# Patient Record
Sex: FEMALE | Race: WHITE
[De-identification: names, ages, dates, MRNs, and addresses within clinical notes are randomized per-mention and may not be internally consistent; named-entity substitution may affect disease eponyms.]

---

## 2019-02-13 ENCOUNTER — HOSPITAL ENCOUNTER (INPATIENT)
Dept: HOSPITAL 25 - ED | Age: 49
LOS: 6 days | Discharge: HOME | DRG: 317 | End: 2019-02-19
Attending: HOSPITALIST | Admitting: INTERNAL MEDICINE
Payer: COMMERCIAL

## 2019-02-13 DIAGNOSIS — L02.415: ICD-10-CM

## 2019-02-13 DIAGNOSIS — Z79.899: ICD-10-CM

## 2019-02-13 DIAGNOSIS — M86.171: ICD-10-CM

## 2019-02-13 DIAGNOSIS — I10: ICD-10-CM

## 2019-02-13 DIAGNOSIS — Z80.0: ICD-10-CM

## 2019-02-13 DIAGNOSIS — Z91.010: ICD-10-CM

## 2019-02-13 DIAGNOSIS — F17.210: ICD-10-CM

## 2019-02-13 DIAGNOSIS — R11.2: ICD-10-CM

## 2019-02-13 DIAGNOSIS — E11.621: ICD-10-CM

## 2019-02-13 DIAGNOSIS — Z91.041: ICD-10-CM

## 2019-02-13 DIAGNOSIS — E11.65: ICD-10-CM

## 2019-02-13 DIAGNOSIS — Z80.1: ICD-10-CM

## 2019-02-13 DIAGNOSIS — L97.818: ICD-10-CM

## 2019-02-13 DIAGNOSIS — L97.519: ICD-10-CM

## 2019-02-13 DIAGNOSIS — B95.1: ICD-10-CM

## 2019-02-13 DIAGNOSIS — I73.9: ICD-10-CM

## 2019-02-13 DIAGNOSIS — Z88.2: ICD-10-CM

## 2019-02-13 DIAGNOSIS — E11.69: Primary | ICD-10-CM

## 2019-02-13 DIAGNOSIS — Z88.0: ICD-10-CM

## 2019-02-13 DIAGNOSIS — E11.622: ICD-10-CM

## 2019-02-13 DIAGNOSIS — Z82.3: ICD-10-CM

## 2019-02-13 LAB
ANION GAP SERPL CALC-SCNC: 8 MMOL/L (ref 2–11)
BASOPHILS # BLD AUTO: 0.1 10^3/UL (ref 0–0.2)
BUN SERPL-MCNC: 12 MG/DL (ref 6–24)
BUN/CREAT SERPL: 19 (ref 8–20)
CALCIUM SERPL-MCNC: 9.4 MG/DL (ref 8.6–10.3)
CHLORIDE SERPL-SCNC: 103 MMOL/L (ref 101–111)
EOSINOPHIL # BLD AUTO: 0.2 10^3/UL (ref 0–0.6)
GLUCOSE SERPL-MCNC: 291 MG/DL (ref 70–100)
HCO3 SERPL-SCNC: 22 MMOL/L (ref 22–32)
HCT VFR BLD AUTO: 46 % (ref 35–47)
HGB BLD-MCNC: 15.8 G/DL (ref 12–16)
LYMPHOCYTES # BLD AUTO: 2.3 10^3/UL (ref 1–4.8)
MCH RBC QN AUTO: 31 PG (ref 27–31)
MCHC RBC AUTO-ENTMCNC: 35 G/DL (ref 31–36)
MCV RBC AUTO: 90 FL (ref 80–97)
MONOCYTES # BLD AUTO: 0.6 10^3/UL (ref 0–0.8)
NEUTROPHILS # BLD AUTO: 8.8 10^3/UL (ref 1.5–7.7)
NRBC # BLD AUTO: 0 10^3/UL
NRBC BLD QL AUTO: 0
PLATELET # BLD AUTO: 301 10^3/UL (ref 150–450)
POTASSIUM SERPL-SCNC: 4.1 MMOL/L (ref 3.5–5)
RBC # BLD AUTO: 5.08 10^6/UL (ref 4–5.4)
SODIUM SERPL-SCNC: 133 MMOL/L (ref 135–145)
WBC # BLD AUTO: 12 10^3/UL (ref 3.5–10.8)

## 2019-02-13 PROCEDURE — 87640 STAPH A DNA AMP PROBE: CPT

## 2019-02-13 PROCEDURE — 93005 ELECTROCARDIOGRAM TRACING: CPT

## 2019-02-13 PROCEDURE — C1894 INTRO/SHEATH, NON-LASER: HCPCS

## 2019-02-13 PROCEDURE — 87077 CULTURE AEROBIC IDENTIFY: CPT

## 2019-02-13 PROCEDURE — 87641 MR-STAPH DNA AMP PROBE: CPT

## 2019-02-13 PROCEDURE — 36415 COLL VENOUS BLD VENIPUNCTURE: CPT

## 2019-02-13 PROCEDURE — 87205 SMEAR GRAM STAIN: CPT

## 2019-02-13 PROCEDURE — G0378 HOSPITAL OBSERVATION PER HR: HCPCS

## 2019-02-13 PROCEDURE — 80202 ASSAY OF VANCOMYCIN: CPT

## 2019-02-13 PROCEDURE — 90686 IIV4 VACC NO PRSV 0.5 ML IM: CPT

## 2019-02-13 PROCEDURE — 87076 CULTURE ANAEROBE IDENT EACH: CPT

## 2019-02-13 PROCEDURE — 87184 SC STD DISK METHOD PER PLATE: CPT

## 2019-02-13 PROCEDURE — 93922 UPR/L XTREMITY ART 2 LEVELS: CPT

## 2019-02-13 PROCEDURE — C1887 CATHETER, GUIDING: HCPCS

## 2019-02-13 PROCEDURE — 87073 CULTURE BACTERIA ANAEROBIC: CPT

## 2019-02-13 PROCEDURE — 86140 C-REACTIVE PROTEIN: CPT

## 2019-02-13 PROCEDURE — 85025 COMPLETE CBC W/AUTO DIFF WBC: CPT

## 2019-02-13 PROCEDURE — 84520 ASSAY OF UREA NITROGEN: CPT

## 2019-02-13 PROCEDURE — 87070 CULTURE OTHR SPECIMN AEROBIC: CPT

## 2019-02-13 PROCEDURE — 99156 MOD SED OTH PHYS/QHP 5/>YRS: CPT

## 2019-02-13 PROCEDURE — 76937 US GUIDE VASCULAR ACCESS: CPT

## 2019-02-13 PROCEDURE — 93925 LOWER EXTREMITY STUDY: CPT

## 2019-02-13 PROCEDURE — 83036 HEMOGLOBIN GLYCOSYLATED A1C: CPT

## 2019-02-13 PROCEDURE — C1769 GUIDE WIRE: HCPCS

## 2019-02-13 PROCEDURE — 80053 COMPREHEN METABOLIC PANEL: CPT

## 2019-02-13 PROCEDURE — 80048 BASIC METABOLIC PNL TOTAL CA: CPT

## 2019-02-13 PROCEDURE — 99406 BEHAV CHNG SMOKING 3-10 MIN: CPT

## 2019-02-13 PROCEDURE — 87040 BLOOD CULTURE FOR BACTERIA: CPT

## 2019-02-13 PROCEDURE — 87186 SC STD MICRODIL/AGAR DIL: CPT

## 2019-02-13 PROCEDURE — 82565 ASSAY OF CREATININE: CPT

## 2019-02-13 PROCEDURE — 93978 VASCULAR STUDY: CPT

## 2019-02-13 PROCEDURE — 99157 MOD SED OTHER PHYS/QHP EA: CPT

## 2019-02-13 PROCEDURE — 85652 RBC SED RATE AUTOMATED: CPT

## 2019-02-13 PROCEDURE — 99283 EMERGENCY DEPT VISIT LOW MDM: CPT

## 2019-02-13 RX ADMIN — CEFTRIAXONE SODIUM SCH MLS/HR: 1 INJECTION, POWDER, FOR SOLUTION INTRAVENOUS at 18:09

## 2019-02-13 RX ADMIN — ACETAMINOPHEN PRN MG: 325 TABLET ORAL at 17:39

## 2019-02-13 RX ADMIN — INSULIN LISPRO SCH UNITS: 100 INJECTION, SOLUTION INTRAVENOUS; SUBCUTANEOUS at 20:53

## 2019-02-13 RX ADMIN — PSEUDOEPHEDRINE HCL SCH MG: 120 TABLET, FILM COATED, EXTENDED RELEASE ORAL at 17:39

## 2019-02-13 RX ADMIN — SODIUM CHLORIDE, SODIUM LACTATE, POTASSIUM CHLORIDE, AND CALCIUM CHLORIDE SCH MLS/HR: 600; 310; 30; 20 INJECTION, SOLUTION INTRAVENOUS at 20:16

## 2019-02-13 RX ADMIN — IBUPROFEN PRN MG: 800 TABLET ORAL at 20:23

## 2019-02-13 RX ADMIN — VANCOMYCIN HYDROCHLORIDE SCH MLS/HR: 1 INJECTION, POWDER, LYOPHILIZED, FOR SOLUTION INTRAVENOUS at 23:43

## 2019-02-13 RX ADMIN — IBUPROFEN PRN MG: 800 TABLET ORAL at 14:36

## 2019-02-13 RX ADMIN — LORATADINE SCH MG: 10 TABLET ORAL at 17:39

## 2019-02-13 RX ADMIN — ACETAMINOPHEN PRN MG: 325 TABLET ORAL at 23:46

## 2019-02-13 NOTE — PN
Hospitalist Progress Note


Date of Service: 02/13/19





Ms Marsh is a 48 year old female with no known/reported medical history that 

was [place] [facility] [place] non-healing right shin ulcer and wound to 3rd toe. 

Patient has been seen by Dr. Doyle from surgery who sent the patient for 

MRI to r/o osteo. Imaging reveals an abscess that will require surgical 

debridement. Patient is also found to have an A1C of >9, and TEAGAN's obtained are 

in the range of 0.3-0.6, indicating poor vascular flow. Plan is to take the 

patient to the OR tomorrow for surgical debridement of the shin, follow up MRI 

of the toe and revascularization in IR with Dr. Esquivel on Friday.





In terms of medical optimization, we will obtain an EKG and start patient on 

insulin SS and diabetic diet. Patient denies chest pain, no SOB, no chest pain 

at rest. She sleeps on a single pillow at night and endorses no exertional 

dyspnea. she is able to walk up several flights of stairs without requiring 

rest. She has no family history of CAD/no sudden cardiac death.





Patient is medically optimized for surgery, she has an RCRI Risk Score of 1 

Point, Class II Risk with a 6.0% chance of major cardiac event. I would 

recommend consultation with endocrine before discharge to help manage newly 

diagnosed diabetes to aid in wound healing. Patient is cleared to proceed with 

surgery in the morning. Keep NPO after midnight. Continue atbx as ordered, 

follow cultures.

## 2019-02-13 NOTE — CONS
CONSULTATION REPORT:

 

DATE OF CONSULT:  02/13/19

 

REFERRING PROVIDER:  Dr. Ora Deng.

 

REASON FOR CONSULTATION:  Right third toe ulcer and right distal anterior shin 
ulcer with cellulitis.

 

HISTORY OF PRESENT ILLNESS:  Ms. Brandy Marsh is a 48-year-old woman, who does 
not undergo routine medical care, who lives in [place], [place], who was 
seen by me in the [facility] this morning after she was referred from the 
[facility] where she was seen last week.

 

She presented to the emergency room there last week.  According to the records 
and to her, she had complaints of a new wound at the distal right anterior shin
, which had worsened.  In addition, she states that last fall when she was 
trimming her nails on the right third toe, sustained an injury with a 
persistent ulceration that had not healed since that time.  She had not sought 
care, but when the ulceration developed in the right lower shin area and became 
chronic and subsequently enlarged in size over the past several weeks, she 
presented to the emergency room.

 

She denied recent fevers.  She was noted to be afebrile on a visit there last 
week. At that time, she was noted to have a white blood cell count of 15,000.  
Her blood sugar was noted to be 257.  Electrolytes and renal function were all 
unremarkable. She had an albumin of 3.5.

 

I do not see where plain x-rays were obtained.  She thought that cultures had 
been obtained, but I do not see them either in the [facility] records or the [facility] records.

 

She was started on clindamycin and referred to the wound center here today.

 

She states that the right foot and distal calf have been red and swollen for 
several weeks.  She has not had fever, shakes, or chills.  She has been 
ambulating wearing shoes.  She does not work; however, does spend the day 
taking care of her grandchildren.

 

She smokes about a quarter pack of cigarettes per day.  She denies other 
medical issues such as diabetes, coronary artery disease, peripheral vascular 
disease.  She has no neuropathic changes in either lower extremity.

 

PAST MEDICAL HISTORY:  Unremarkable.

 

PAST SURGICAL HISTORY:  None.

 

MEDICATIONS:  Clindamycin, started last week.

 

ALLERGIES:  GADOLINIUM, IODINE, peanuts, PENICILLIN, and SULFA.

 

SOCIAL HISTORY:  She does not drink alcohol.  She lives with her .  She 
smokes about a quarter pack of cigarettes per day.

 

REVIEW OF SYSTEMS:  Otherwise, unremarkable as per above.

 

PHYSICAL EXAM:  She is afebrile.  Heart rate is regular in the 80s.  In general
, she is a well-developed, well-nourished female, appears to be in no apparent 
distress.  The left lower extremity shows no evidence of swelling, hair loss.  
She has weakly palpable dorsalis pedis and posterior tibial pulses in the left 
foot. There is no ulceration in the left foot.  She seems to be intact to light 
touch and pinprick.  In the right lower extremity, there is redness of the foot 
in the distal half of the shin with edema.  This is mildly tender.

 

Overlying the nail bed at the right third toe is an opening without evidence of 
an abscess or purulent drainage.  However, this probes several millimeters, but 
it is too painful to advance and I am not certain if bone is exposed or this 
tracts to bone.

 

Also noted on the right distal shin is a chronic-appearing ulcer of 
approximately 3 x 3 cm with a fibrin base.  More proximal and in confluence 
with this lower ulceration is some discolored skin and more superficial 
ulceration with some turbid fluid that drains from around the edges.  There is 
no dry eschar.  I appreciate no fluctuance.  There is no crepitans noted.

 

IMPRESSION:  Right third toe ulcer, right lower shin ulceration.  The patient 
is quite vague as to the etiology of the shin ulcer and basically states that 
"these have just appeared."  She denies trauma, insect bites, or repetitive 
injury.  I am not certain of the etiology, although this may be venous. The 
history is not typical for this. I think she may have a component of peripheral 
vascular disease and is a smoker as well.

 

Also, the concern is an osteomyelitis in the right third toe or distal foot, 
which also needs to be evaluated.

 

After seeing the patient in the wound center and evaluating her leg and the 
fact that it does not appear to be improving with local wound care as well as 
oral clindamycin, I feel strongly that she should be admitted to the hospital 
for further care including IV antibiotics and an expedited workup.

 

I discussed the care with Dr. Deng, the hospitalist who will admit, and we will 
see her in surgical consultation for wound care and further workup.

 

PLAN:

1.  I recommend IV antibiotics.

2.  Wound care will be initiated.

3.  MRI of the foot should be obtained to rule out osteomyelitis.

4.  Consider a CAT scan of the lower extremity to rule out deeper soft tissue 
infection mainly in the distal shin area.

5.  Noninvasive arterial study should be obtained.

6.  Consideration of ID consultation for management of antibiotics in light of 
her allergies and possible osteomyelitis.

 

Thank you for the consultation.  We will follow her closely with you.

 

 [id number]/[facility] #: [id number]

YOSEF

## 2019-02-13 NOTE — HP
CC:  Dr. Kendra Renner; Dr. Keshav Doyle *

 

HISTORY AND PHYSICAL:

 

DATE OF ADMISSION:  19

 

PRIMARY CARE PROVIDER:  Dr. Kendra Renner.

 

OTHER PROVIDER:  Dr. Keshav Doyle.

 

ATTENDING PHYSICIAN:  Dr. Ora Deng * (dictated by OZZY Gale).

 

CHIEF COMPLAINT:  Nonhealing wounds on the right lower extremity.

 

HISTORY OF PRESENT ILLNESS:  Mrs. Marsh is a 48-year-old female with no 
reported past medical history, who complains of right lower extremity third 
digit wound that has been present for approximately 8 months.  She states she 
was filing her toenail with a Dremel like tool.  The machine was meka and she 
injured her third toe.  She also states that at that time a month ago, she had 
a lateral shin wound that was also present.  She is unsure how she got that 
wound.  She managed the wounds on her own for approximately 6-1/2 months and 
they shrank in size and then would reappear, but never fully healed.  
Approximately 1-1/2 months ago, the patient reports that she had swelling and 
redness that were intermittent and located only in her foot. The wound on her 
third digit was still present at that time.  She states that the swelling and 
redness would occur 1 to 2 times per week.  She believes that this began when 
the wound was almost healed, but was re-injured by a dog in her household 
stepping on her toe.  This persisted for approximately a month and a half.

 

Approximately 1 week ago, the patient states that she woke up and her right 
pant leg was soaked through from weeping of the shine wound on the right lower 
extremity that was located on her shin.  She sought medical attention at that 
time and was given a prescription for clindamycin 300 mg q.i.d., which she has 
been taking as directed.  She was also instructed in managing the wound with wet
-to-dry dressings at night and dry dressings during the day.  She did this for 
one week.



Today, she presented to the [facility] to see Dr. Doyle regarding her 
shin wound, which had increased in size over the last 1 week. The right toe 
wound remains the same size, but is also not healing and has an opening at the 
base of the toe.  The foot and shin had turned red and had started to swell.  
The patient states that she has tried ibuprofen, which has helped a bit with 
the pain. Elevation does not appear to change the pain or decrease the 
swelling.  Today, she describes the pain as an ache that is rated at a 2/10.  
The pain does not radiate.  It is constant with varying degrees of intensity 
that ebbs and flows.  The shin redness appeared Wednesday with the onset of 
weeping.  The patient denies chest pain, shortness of breath, headache, vision 
changes, abdominal pain, nausea, vomiting, diarrhea, constipation, or pain in 
her upper extremities or left lower extremity.  She denies calf tenderness.  
She denies fever, chills, warmth of the right lower extremity, color changes 
other than erythema to the right lower extremity.  She saw Dr. Doyle today 
who sent her to be directly admitted.  She came to the ER and was evaluated.  
While in the emergency room, the patient received a workup.  The hospitalists 
were then asked to evaluate for admission.

 

PAST MEDICAL HISTORY:  None.

 

PAST SURGICAL HISTORY:   x1; lumbar spine L5-S1 surgery; cervical 
spine surgery, plates and screws; hysterectomy.

 

HOME MEDICATIONS:  Loratadine/pseudoephedrine 1 tab p.o. q.p.m.

 

ALLERGIES:  IODINATED CONTRAST, numbness; PENICILLIN, swelling of face, lips 
and throat; SULFA, swelling of face, lips and throat; PEANUTS, hives and throat 
swelling.

 

FAMILY HISTORY:  Father passed away of lung cancer.  Mother is healthy.  The 
patient states that all of her grandparents are .  She notes a history 
of esophageal cancer as well as CVA, although she is unsure how her 
grandparents passed away.

 

SOCIAL HISTORY:  The patient smokes approximately a quarter of a pack per day 
for approximately 30 years.  She rarely drinks, stating that she drinks less 
than 1 drink per week.  She is  and lives with her .  She has 2 
kids who live out of the house.  She takes care of her grandchildren.  In the 
event that she is unable to make her own medical decisions, she appoints her 
, Chuck Marsh, phone number [phone], as her decision maker.

 

REVIEW OF SYSTEMS:  A 10-point review of systems was performed and all the 
pertinent positives and negatives are in the HPI.  All other systems are 
negative.

 

                               PHYSICAL EXAMINATION

 

GENERAL:  Ms. Marsh is a well-developed, well-nourished white woman, who is 
sitting in a chair.  She is in no acute distress.  She appears her stated age.

 

VITAL SIGNS:  Temperature 99.6, heart rate is 66, respiratory rate is 16, 
oxygen saturation is 98% on room air, blood pressure is 134/82.

 

HEENT:  Visual fields are grossly intact.  Pupils are equally round and 
reactive to light.  Extraocular movements are intact.  Sclerae are without 
icterus.  Hearing is grossly intact.  External auditory canals are patent.  
They are free of cerumen. The tympanic membranes are intact with visible 
landmarks.  Nares are patent with moist mucous membranes and nonerythematous.  
Oral mucous membranes are moist and without lesions.  Pharynx is clear.

 

NECK:  Full range of motion.  Thyroid is not palpable.  Trachea is at midline.  
No lymphadenopathy.  Nontender to palpation.

 

RESPIRATORY:  Symmetrical chest expansion with no use of accessory muscles.  
Lungs are clear to auscultation.  No rhonchi, wheezes, or rales.

 

CARDIOVASCULAR:  Regular rate and rhythm.  S1, S2 present.  No murmurs, rubs, 
or gallops.  No JVD.

 

ABDOMEN:  Bowel sounds in all quadrants.  Abdomen is soft and nontender to 
palpation.  There is no hepatosplenomegaly.

 

MUSCULOSKELETAL:  Full range of motion.  No pain or deformities.

 

EXTREMITIES:  Skin is warm and smooth bilaterally.  There is no clubbing or 
cyanosis.  Radial and pedal pulses are 2+ bilaterally.  No edema in the upper 
extremities or the left lower extremity.  Right lower extremity with 1+ pitting 
edema.  The right lower extremity is erythematous, but not warm to the touch.  
There is a toenail deformity as well as a small wound at the base of the right 
third toenail which appears deep.  The wound is weeping and has purulent 
discharge when gently expressed.   The right shin has a large wound with 
regular edges that is deep.  The wound is approximately 2 mm deep.  The open 
wound measures approximately 1.5 x 1.5 inches. Just above that wound, there is 
an unopened wound that is flat against the skin and measures approximately 2 x 
1.5 inches.  The patient's right lower extremity is erythematous from the knee 
distal.

 

NEURO:  The patient is awake, alert, and oriented x3.  Cranial nerves are 
grossly intact.  She is able to move all of her extremities.  Her motor 
strength is 5/5 in both upper and lower extremities.  She has a steady gait 
with no impairments.

 

 DIAGNOSTIC STUDIES/LAB DATA:  WBC 12.0, RBC 5.08, HGB 15.8, HCT 46, ESR 44.  
Sodium 133, potassium 4.1, chloride 103, carbon dioxide 22, BUN 12, creatinine 
0.63, glucose 291.  CRP 15.78.

 

ASSESSMENT AND PLAN:  Ms. Marsh is a 48-year-old female with no past medical 
history, who presents to the ER today with complaints of a nonhealing wound.  
The patient will be admitted to observation for:

 

1.  Cellulitis versus osteomyelitis.  The patient has 2 lower extremity wounds 
that have not healed or improved with the use of clindamycin over the last 1 
week. She has had these wounds for approximately 8 months; they have gone 
through various stages of healing, but have never fully healed.  For pain 
control, acetaminophen 650 or oxycodone/acetaminophen have been ordered.  An 
MRI of the right lower extremity has been ordered to determine if the patient 
has osteomyelitis.  Ankle-brachial indexes have been ordered to determine if 
the patient has sufficient blood flow to the area.  Infectious Disease has been 
consulted regarding the patient's multiple allergies to antibiotics such as 
PENICILLIN and SULFA..  Dr. Doyle and the surgical team will continue to 
follow along as he was her wound care doctor.  Vancomycin has been ordered.  
Ceftriaxone has been ordered.

2.  Hyperglycemia.  The patient's blood glucose random was 291.  Hemoglobin A1c 
ordered.  Fingerstick glucose a.c. ordered.  Once results of hemoglobin A1c 
obtained, consider diabetic diet education if necessary.

3.  FEN:  IV normal saline at 100 cc per hour x1 bag.  Heart-healthy, 
consistent carbohydrate diet.

4.  DVT prophylaxis:  Based on the DVT Risk Assessment, the patient is moderate 
risk.  I will order Lovenox 40 daily.

5.  Code status:  The patient is full code.

 

TIME SPENT:  Approximately 60 minutes was spent on this admission, greater than 
half that time was spent with the patient obtaining history, performing physical
, and reviewing the plan of care.

 

The case has been reviewed with my attending, Dr. Deng, who is in agreement 
with the plan of care.

 

 ____________________________________ 

OZZY TYSON

 

[id number]/[facility] #: [id number]

YOSEF

## 2019-02-13 NOTE — ED
Skin Complaint





- HPI Summary


HPI Summary: 


Patient is a 48-year-old female who was sent in by the wound care.  Clinic for 

concern over osteomyelitis.  Dr. Doyle called to Dr. Deng for a direct 

admit.  However, patient came to ED.  Basic labs obtained.  Patient states the 

course of less than 2 weeks, a small area to the anterior right lower extremity 

has now become a large ulcer.  Also she states she has been dealing with the 

middle toe with erythema x 8 mos which has recently worsened over the past 1.5 

mos after her dogs nail punctured the area.  This continues to drain 

intermittently, yellow purulent discharge.  None currently.  She denies any 

fevers, sweats, chills.  She denies any pain to the lower extremity otherwise.








- History of Current Complaint


Chief Complaint: EDSoftTissueLowExtr


Time Seen by Provider: 02/13/19 11:57


Stated Complaint: WOUND ON RIGHT LEG


Hx Obtained From: Patient


Onset/Duration: Started Hours Ago, Started Weeks Ago


Skin Exposure Onset/Duration: Hours Ago, Weeks Ago


Timing: Constant


Onset Severity: Mild


Current Severity: Mild


Pain Intensity: 5


Pain Scale Used: 0-10 Numeric


Skin Location: Discrete - right lower extremity BTK


Character: Swelling, Pruritus, Redness


Aggravating Symptom(s): Nothing


Alleviating Symptom(s): Nothing


Associated Signs & Symptoms: Negative


Related History: Trauma





- Allergy/Home Medications


Allergies/Adverse Reactions: 


 Allergies











Allergy/AdvReac Type Severity Reaction Status Date / Time


 


Iodinated Contrast- Oral and Allergy  Numbness Verified 02/13/19 11:09





IV Dye     


 


Penicillins Allergy  Swelling Verified 02/13/19 11:09





   Of  





   Face,Lips,&  





   Throat  


 


Sulfa (Sulfonamide Allergy  Itching Verified 02/13/19 11:09





Antibiotics)     











Home Medications: 


 Home Medications





Loratadine/Pseudoephedrine [Allergy Relief D-24Hr Tablet] 1 tab PO QPM 02/13/19 

[History Confirmed 02/13/19]











PMH/Surg Hx/FS Hx/Imm Hx


Previously Healthy: Yes


Infectious Disease History: No


Infectious Disease History: 


   Denies: Traveled Outside the [place] in Last 30 Days





- Social History


Occupation: Employed Full-time


Lives: With Family


Alcohol Use: None


Hx Substance Use: No


Substance Use Type: Reports: None


Hx Tobacco Use: No





Review of Systems


Constitutional: Negative


Negative: Fever, Chills, Fatigue, Skin Diaphoresis


Negative: Palpitations, Chest Pain


Negative: Shortness Of Breath, Cough


Negative: Abdominal Pain, Vomiting, Diarrhea, Nausea


Genitourinary: Negative


Positive: no symptoms reported, see HPI


Negative: Arthralgia, Myalgia


Positive: Other - erythematous the right lower extremity with large 2 cm and 3 

cm ulceration


Neurological: Negative


All Other Systems Reviewed And Are Negative: Yes





Physical Exam


Triage Information Reviewed: Yes


Vital Signs On Initial Exam: 


 Initial Vitals











Temp Pulse Resp BP Pulse Ox


 


 99.3 F   106   16   146/72   95 


 


 02/13/19 11:03  02/13/19 11:03  02/13/19 11:03  02/13/19 11:03  02/13/19 11:03











Vital Signs Reviewed: Yes


Appearance: Positive: Well-Appearing, Well-Nourished


Skin: Positive: Warm, Skin Color Reflects Adequate Perfusion, Other - Right 

lower extremity erythema with 2 cm and 3 cm ulcerations with no discharge 

currently.  No excoriations.  Patient endorses sensation of itching.


Neck: Positive: Supple, No Lymphadenopathy


Respiratory/Lung Sounds: Positive: Clear to Auscultation, Breath Sounds Present


Cardiovascular: Positive: RRR, Pulses are Symmetrical in both Upper and Lower 

Extremities


Musculoskeletal: Positive: Normal, Strength/ROM Intact


Neurological: Positive: Speech Normal


Psychiatric: Positive: Normal, Affect/Mood Appropriate





Diagnostics





- Vital Signs


 Vital Signs











  Temp Pulse Resp BP Pulse Ox


 


 02/13/19 11:03  99.3 F  106  16  146/72  95














- Laboratory


Lab Results: 


 Lab Results











  02/13/19 02/13/19 Range/Units





  11:46 11:46 


 


WBC  12.0 H   (3.5-10.8)  10^3/ul


 


RBC  5.08   (4.00-5.40)  10^6/ul


 


Hgb  15.8   (12.0-16.0)  g/dl


 


Hct  46   (35-47)  %


 


MCV  90   (80-97)  fL


 


MCH  31   (27-31)  pg


 


MCHC  35   (31-36)  g/dl


 


RDW  12   (10.5-15)  %


 


Plt Count  301   (150-450)  10^3/ul


 


MPV  9.8   (7.4-10.4)  fL


 


Neut % (Auto)  72.8   %


 


Lymph % (Auto)  19.4   %


 


Mono % (Auto)  5.4   %


 


Eos % (Auto)  1.9   %


 


Baso % (Auto)  0.5   %


 


Absolute Neuts (auto)  8.8 H   (1.5-7.7)  10^3/ul


 


Absolute Lymphs (auto)  2.3   (1.0-4.8)  10^3/ul


 


Absolute Monos (auto)  0.6   (0-0.8)  10^3/ul


 


Absolute Eos (auto)  0.2   (0-0.6)  10^3/ul


 


Absolute Basos (auto)  0.1   (0-0.2)  10^3/ul


 


Absolute Nucleated RBC  0   10^3/ul


 


Nucleated RBC %  0   


 


ESR  Pending   


 


Sodium   133 L  (135-145)  mmol/L


 


Potassium   4.1  (3.5-5.0)  mmol/L


 


Chloride   103  (101-111)  mmol/L


 


Carbon Dioxide   22  (22-32)  mmol/L


 


Anion Gap   8  (2-11)  mmol/L


 


BUN   12  (6-24)  mg/dL


 


Creatinine   0.63  (0.51-0.95)  mg/dL


 


Est GFR ( Amer)   122.0  (>60)  


 


Est GFR (Non-Af Amer)   100.9  (>60)  


 


BUN/Creatinine Ratio   19.0  (8-20)  


 


Glucose   291 H  ()  mg/dL


 


Calcium   9.4  (8.6-10.3)  mg/dL


 


C-Reactive Protein   15.78 H  (<8.01)  mg/L











Result Diagrams: 


 02/13/19 11:46





 02/13/19 11:46


Lab Statement: Any lab studies that have been ordered have been reviewed, and 

results considered in the medical decision making process.





Course/Dx





- Course


Course Of Treatment: During the course of treatment, the patient is evaluated 

for right lower extremity erythema, blanchable, non-painful, slightly edematous 

with large 3 cm and 2 cm respectively ulcerations to the right anterior lower 

extremity.  She also endorses right middle toe.  Discharge which is been 

intermittent.  She was sent here by Dr. Doyle for direct admit, however she 

came to the ED.  Discussed this case with Dr. alston at 12:30 PM who agrees to 

admit to the floor.  Labs obtained.  Wound culture was obtained at wound 

clinic.  They will continue to provide imaging once patient is admitted.





- Differential Diagnoses - Skin Complaint


Differential Diagnoses: Other - Osteomyelitis





- Diagnoses


Provider Diagnoses: 


 Ulcer, Cellulitis








Discharge





- Sign-Out/Discharge


Documenting (check all that apply): Patient Departure


Patient Received Moderate/Deep Sedation with Procedure: No





- Discharge Plan


Condition: Stable


Disposition: ADMITTED TO [facility]


Referrals: 


No Primary Care Phys,NOPCP [Primary Care Provider] - 





- Billing Disposition and Condition


Condition: STABLE


Disposition: Admitted to [facility]

## 2019-02-14 LAB
ANION GAP SERPL CALC-SCNC: 6 MMOL/L (ref 2–11)
BASOPHILS # BLD AUTO: 0.1 10^3/UL (ref 0–0.2)
BUN SERPL-MCNC: 9 MG/DL (ref 6–24)
BUN/CREAT SERPL: 20.9 (ref 8–20)
CALCIUM SERPL-MCNC: 9.1 MG/DL (ref 8.6–10.3)
CHLORIDE SERPL-SCNC: 105 MMOL/L (ref 101–111)
EOSINOPHIL # BLD AUTO: 0.1 10^3/UL (ref 0–0.6)
GLUCOSE SERPL-MCNC: 211 MG/DL (ref 70–100)
HCO3 SERPL-SCNC: 26 MMOL/L (ref 22–32)
HCT VFR BLD AUTO: 43 % (ref 35–47)
HGB BLD-MCNC: 14.5 G/DL (ref 12–16)
LYMPHOCYTES # BLD AUTO: 1.2 10^3/UL (ref 1–4.8)
MCH RBC QN AUTO: 31 PG (ref 27–31)
MCHC RBC AUTO-ENTMCNC: 34 G/DL (ref 31–36)
MCV RBC AUTO: 90 FL (ref 80–97)
MONOCYTES # BLD AUTO: 0.6 10^3/UL (ref 0–0.8)
NEUTROPHILS # BLD AUTO: 8.4 10^3/UL (ref 1.5–7.7)
NRBC # BLD AUTO: 0 10^3/UL
NRBC BLD QL AUTO: 0.1
PLATELET # BLD AUTO: 243 10^3/UL (ref 150–450)
POTASSIUM SERPL-SCNC: 3.7 MMOL/L (ref 3.5–5)
RBC # BLD AUTO: 4.75 10^6/UL (ref 4–5.4)
SODIUM SERPL-SCNC: 137 MMOL/L (ref 135–145)
WBC # BLD AUTO: 10.4 10^3/UL (ref 3.5–10.8)

## 2019-02-14 PROCEDURE — 0KBS0ZZ EXCISION OF RIGHT LOWER LEG MUSCLE, OPEN APPROACH: ICD-10-PCS | Performed by: SURGERY

## 2019-02-14 PROCEDURE — 0JBQ0ZZ EXCISION OF RIGHT FOOT SUBCUTANEOUS TISSUE AND FASCIA, OPEN APPROACH: ICD-10-PCS | Performed by: SURGERY

## 2019-02-14 RX ADMIN — ENOXAPARIN SODIUM SCH MG: 40 INJECTION SUBCUTANEOUS at 18:44

## 2019-02-14 RX ADMIN — TRAMADOL HYDROCHLORIDE PRN MG: 50 TABLET, FILM COATED ORAL at 12:40

## 2019-02-14 RX ADMIN — VANCOMYCIN HYDROCHLORIDE SCH MLS/HR: 1 INJECTION, POWDER, LYOPHILIZED, FOR SOLUTION INTRAVENOUS at 23:21

## 2019-02-14 RX ADMIN — IBUPROFEN PRN MG: 800 TABLET ORAL at 04:36

## 2019-02-14 RX ADMIN — INSULIN LISPRO SCH UNITS: 100 INJECTION, SOLUTION INTRAVENOUS; SUBCUTANEOUS at 21:11

## 2019-02-14 RX ADMIN — CEFTRIAXONE SODIUM SCH MLS/HR: 1 INJECTION, POWDER, FOR SOLUTION INTRAVENOUS at 15:50

## 2019-02-14 RX ADMIN — SODIUM CHLORIDE, SODIUM LACTATE, POTASSIUM CHLORIDE, AND CALCIUM CHLORIDE SCH MLS/HR: 600; 310; 30; 20 INJECTION, SOLUTION INTRAVENOUS at 05:35

## 2019-02-14 RX ADMIN — INSULIN LISPRO SCH: 100 INJECTION, SOLUTION INTRAVENOUS; SUBCUTANEOUS at 17:36

## 2019-02-14 RX ADMIN — INSULIN LISPRO SCH UNITS: 100 INJECTION, SOLUTION INTRAVENOUS; SUBCUTANEOUS at 07:31

## 2019-02-14 RX ADMIN — SODIUM CHLORIDE, SODIUM LACTATE, POTASSIUM CHLORIDE, AND CALCIUM CHLORIDE SCH MLS/HR: 600; 310; 30; 20 INJECTION, SOLUTION INTRAVENOUS at 21:05

## 2019-02-14 RX ADMIN — INSULIN LISPRO SCH UNITS: 100 INJECTION, SOLUTION INTRAVENOUS; SUBCUTANEOUS at 12:40

## 2019-02-14 RX ADMIN — VANCOMYCIN HYDROCHLORIDE SCH MLS/HR: 1 INJECTION, POWDER, LYOPHILIZED, FOR SOLUTION INTRAVENOUS at 17:18

## 2019-02-14 RX ADMIN — LORATADINE SCH: 10 TABLET ORAL at 19:00

## 2019-02-14 RX ADMIN — VANCOMYCIN HYDROCHLORIDE SCH MLS/HR: 1 INJECTION, POWDER, LYOPHILIZED, FOR SOLUTION INTRAVENOUS at 07:32

## 2019-02-14 RX ADMIN — PSEUDOEPHEDRINE HCL SCH: 120 TABLET, FILM COATED, EXTENDED RELEASE ORAL at 19:00

## 2019-02-14 NOTE — PN
Subjective


Date of Service: 02/14/19


Interval History: 





Pt is sitting in bed.  During our discussion she becomes nauseous and 

diaphoretic.  Pt was given Dilaudid and Fentanyl for her procedure this morning

, and she admits to GI upset with Percocent.  Pt was given Zofran, which 

resolved her symptoms.  VS were taken at time of event and are recorded below 

and are all WNL, except for slightly elevated BP.





Pt expresses frustration with the past days events, stating that her HA1C was 

done recently, and it was "normal," but now it is elevated and she will be 

treated for diabetes.  She is also requiring surgical intervention for 

nonhealing wounds of the RLE and revascularization.  





She denies CP, SOB, abd pain, v/d/c, pain in UE or LLE.  She has pain in the RLE

, which she rates at 4/10 currently.





1730: this evening, pt feels and looks well, but continues to have n/v.  She 

states that she is very intolerant to percocet and believes she may be 

intolerant to other opiate medications as well.








Objective


Active Medications: 








Acetaminophen (Tylenol Tab*)  650 mg PO Q4H PRN


Dextrose (D50w Syringe 50 Ml*)  12.5 gm IV PUSH .FOR FS < 60 - SS PRN


Dimenhydrinate (Dramamine Iv*)  25 mg IV PUSH ONCE PRN


Diphenhydramine HCl (Benadryl Iv*)  25 mg IV ONCE PRN


Fentanyl Citrate (Fentanyl*)  25 mcg IV Q5M PRN


Hydromorphone HCl (Dilaudid Inj1s*)  0.5 mg IV SLOW PU Q2H PRN


Hydromorphone HCl (Dilaudid Inj1s*)  1 mg IV SLOW PU Q2H PRN


Ceftriaxone Sodium 1 gm/ (Sodium Chloride)  50 mls @ 200 mls/hr IVPB Q24H SERENA


Vancomycin HCl 1,250 mg/ (Sodium Chloride)  250 mls @ 166.667 mls/hr IVPB Q8H 

SERENA


Lactated Ringer's (Lactated Ringers 1000 Ml Bag*)  1,000 mls @ 125 mls/hr IV 

PER RATE SERENA


Ibuprofen (Motrin Tab*)  800 mg PO Q6H PRN


Insulin Human Lispro (Humalog*)  0 units SUBCUT ACHS SERENA; Protocol


Levalbuterol HCl (Xopenex 1.25 Mg/0.5 Ml Neb.Sol*)  1.25 mg INH ONCE PRN


Loratadine (Claritin Tab(Nf))  10 mg PO 1800 SERENA


Naloxone HCl (Narcan*)  0.08 mg IV Q2M PRN


Ondansetron HCl (Zofran Inj*)  4 mg IV ONCE PRN


Pharmacy Consult (Vancomycin Per Pharmacy*)  1 note FOLLOW UP .VANC PER 

PHARMACY [facility]


Pharmacy Profile Note (Vancomycin Trough Check)  1 note FOLLOW UP 1600 ONE


Prochlorperazine Edisylate (Compazine Inj*)  5 mg IV ONCE PRN


Pseudoephedrine HCl (Sudafed 12 Hour*)  120 mg PO QPM SERENA


Tramadol HCl (Ultram*)  50 mg PO Q6H PRN








Vital Signs:











Temp Pulse Resp BP Pulse Ox


 


 98.4 F   96   14   145/75   94 


 


 02/14/19 11:10  02/14/19 13:03  02/14/19 13:03  02/14/19 13:03  02/14/19 13:03











Oxygen Devices in Use Now: Nasal Cannula


Appearance: Pt is sitting up in bed.  She is diaphoretic and has an emesis 

basin on her lap.


Ears/Nose/Mouth/Throat: NL Teeth, Lips, Gums, Clear Oropharnyx, Mucous 

Membranes Moist


Neck: NL Appearance and Movements; NL JVP, Trachea Midline


Respiratory: Symmetrical Chest Expansion and Respiratory Effort, Clear to 

Auscultation


Cardiovascular: NL Sounds; No Murmurs; No JVD, RRR


Abdominal: NL Sounds; No Tenderness; No Distention, No Hepatosplenomegaly


Extremities: No Clubbing, Cyanosis, - - RLE with CDI dressing at shin and 

distal foot.  B/l calves nontender to palpation.


Skin: No Rash or Ulcers, - - Diaphoretic, warm, and moist


Neurological: Alert and Oriented x 3


Result Diagrams: 


 02/15/19 05:31





 02/15/19 05:26


Additional Lab and Data: 


 Lab Results











  02/13/19 02/13/19 Range/Units





  11:46 11:46 


 


WBC  12.0 H   (3.5-10.8)  10^3/ul


 


RBC  5.08   (4.00-5.40)  10^6/ul


 


Hgb  15.8   (12.0-16.0)  g/dl


 


Hct  46   (35-47)  %


 


MCV  90   (80-97)  fL


 


MCH  31   (27-31)  pg


 


MCHC  35   (31-36)  g/dl


 


RDW  12   (10.5-15)  %


 


Plt Count  301   (150-450)  10^3/ul


 


MPV  9.8   (7.4-10.4)  fL


 


Neut % (Auto)  72.8   %


 


Lymph % (Auto)  19.4   %


 


Mono % (Auto)  5.4   %


 


Eos % (Auto)  1.9   %


 


Baso % (Auto)  0.5   %


 


Absolute Neuts (auto)  8.8 H   (1.5-7.7)  10^3/ul


 


Absolute Lymphs (auto)  2.3   (1.0-4.8)  10^3/ul


 


Absolute Monos (auto)  0.6   (0-0.8)  10^3/ul


 


Absolute Eos (auto)  0.2   (0-0.6)  10^3/ul


 


Absolute Basos (auto)  0.1   (0-0.2)  10^3/ul


 


Absolute Nucleated RBC  0   10^3/ul


 


Nucleated RBC %  0   


 


ESR  Pending   


 


Sodium   133 L  (135-145)  mmol/L


 


Potassium   4.1  (3.5-5.0)  mmol/L


 


Chloride   103  (101-111)  mmol/L


 


Carbon Dioxide   22  (22-32)  mmol/L


 


Anion Gap   8  (2-11)  mmol/L


 


BUN   12  (6-24)  mg/dL


 


Creatinine   0.63  (0.51-0.95)  mg/dL


 


Est GFR ( Amer)   122.0  (>60)  


 


Est GFR (Non-Af Amer)   100.9  (>60)  


 


BUN/Creatinine Ratio   19.0  (8-20)  


 


Glucose   291 H  ()  mg/dL


 


Calcium   9.4  (8.6-10.3)  mg/dL


 


C-Reactive Protein   15.78 H  (<8.01)  mg/L














Assess/Plan/Problems-Billing


Assessment: 





Pt is a 48yof with no reported PMHx who presents with nonhealing wound of RLE, 

osteomyelitis of 3rd digit of RLE, new onset DM, and PAD.








- Patient Problems


(1) Osteomyelitis


Comment: 


-R third toe ulcer debrided today; PT ordered


-Ortho consult ordered


-Await cultures


-Continue vanco, ceftriaxone


   





(2) Nonhealing nonsurgical wound


Comment: 


-I&D of R shin abscess today


-Await cultures


-Continue to monitor with surg, IR   





(3) Peripheral artery disease


Comment: 


-TEAGAN results suggest severe PAD


-IR recommends revascularization; spoke with Sierra today, who states that this 

may be done outpatient, depending on results of treatment, but must be done 

within 2 weeks   





(4) Nausea & vomiting


Comment: 


-Pt has n/v today, s/p surgical debridement of toe and shin wounds


-Ordered Compazine, Scopalamine patch; continue zofran


-D/c opiates


-Ketorolac ordered    





(5) Diabetes


Comment: 


-Diabetic education


-Nutritional consult


-Metformin 500 BID; added to SS lipso


-Continue to monitor AC FS   





(6) DVT prophylaxis


Comment: 


-Lovenox   





(7) Full code status





Status and Disposition: 





Inpatient.  Discharge when stable.

## 2019-02-14 NOTE — OP
DATE OF OPERATION:  02/14/19 - ROOM #334

 

DATE OF BIRTH:  [birth year]

 

SURGEON:  Dr. Doyle.

 

ASSISTANT:  None.

 

ANESTHESIOLOGIST:  Dr. Maria.

 

ANESTHESIA:  General.

 

PREOPERATIVE DIAGNOSES:

1.  Right shin abscess and chronic ulcer.

2.  Right third toe ulcer.

 

POSTOPERATIVE DIAGNOSES:

1.  Right shin abscess and chronic ulcer.

2.  Right third toe ulcer.

 

OPERATIVE PROCEDURES:

1.  Incision and drainage of abscess, right shin.

2.  Debridement of ulcer, right third toe with culture.

 

ESTIMATED BLOOD LOSS:  Minimal.

 

SPECIMENS:

1.  Gram stain and culture from right shin abscess.

2.  Gram stain and culture from fluid, ulcer, right third toe.

 

WOUND CLASSIFICATION:  4.

 

DRAINS:  None.

 

COMPLICATIONS:  None.

 

BRIEF HISTORY:  Ms. Brandy Marsh is a 48-year-old woman who had presented to 
the wound center yesterday with a chronic ulcer on the tip of her right third 
toe.  She also had a chronic large ulcer on the distal right anterior shin with 
necrotic skin and tissue superior to this, concerning for infection.

 

She underwent an MRI yesterday, which showed edema in the right lower extremity 
with concern for an abscess in the subcutaneous space in the right shin.  Also 
noted was probable osteomyelitis in the right third toe.

 

She is now being taking to the operating room for an incision and drainage of 
the presumed abscess with debridement of the ulcer on the shin as well as 
minimal debridement of the right third toe.  She is undergoing a cardiovascular 
workup and has been shown to have significant peripheral vascular disease and 
will require most likely possible vascular intervention before considering 
amputation and/or optimal treatment of the right third toe ulcer as well as the 
presumed osteomyelitis.

 

DESCRIPTION OF PROCEDURE:  Written informed consent was obtained, the right leg 
was marked with indelible ink.  Preoperative antibiotics had already been 
administered. She was taken to the operating room and general anesthesia was 
administered.  The entire right lower extremity was prepped and draped in the 
usual sterile fashion.

 

Time-out verification was completed.

 

Initially, the large ulcer in the distal anterior shin was debrided down to 
healthy muscle and fascia.  There was no extension into the muscle and this was 
completely viable and red and pink.  More superiorly, there was necrotic skin 
that was excised in an area of approximately 3 x 6 cm, exposing an underlying 
abscess that was drained and we drained creamy yellow pus, which was 
nonodorous.  Cultures were sent.  This extended down to the anterior fascia and 
some of the muscle, which was debrided, but this otherwise appeared to be 
viable and there was no deeper extension nor more superior extension.  The 
necrotic skin was debrided.  The area was irrigated.

 

I then packed this wound with a moist Vasquez gauze and wrapped it with a Kerlix.

 

Attention was then turned to the distal tip of the right third toe.  There was 
a scab and the nail had been missing.  The scab was removed and this did reveal 
exposed bone without abscess, however.  Cultures were taken.  The wound was 
minimally debrided, the bacitracin ointment was applied, and a sterile gauze 
was used to cover the distal foot.

 

The patient tolerated the procedure well, was taken to the recovery room in 
stable condition.

 

 [id number]/CPS #: [id number]

YOSEF

## 2019-02-14 NOTE — BRIEFOPN
Brief Operative Note





- Surgery


Procedures: 








 OPERATIVE  NOTE








Pre-Operative Diagnosis: Abscess right shin, right third toe ulcer





Post-Operative Diagnosis: Same





Procedure:I and D abscess right shin, debridement ulcer right third toe





Surgeon:Vivek


Assistant:None


Anesthesia:   General with Dr. Maria


IVF:Min


EBL:Min


Specimen:Gram stain and culture from right shin and right third toe ulcer


Drain: none


Wound Class:4


TO PACU

## 2019-02-14 NOTE — CONSULT
Consult


Consult: 





Date of Service: 19





Requesting Service: [facility] [place] Hospitalist / Valerie Michael NP





Reason for Consultation: Abnormal TEAGAN in the presence of multiple lower leg & 

feet wounds





Admission Date:    19





PRIMARY CARE PROVIDER:  Dr. Kendra Renner.





CHIEF COMPLAINT:  Non-healing wounds on the right lower extremity.





HISTORY OF PRESENT ILLNESS:  


 is present at bedside and supplements H&P. 





Mrs. Marsh is a 48-year-old female with no reported past medical history, who 

complains of right lower extremity third digit wound that has been present for 

approximately 8 months.  She states she was filing her toenail with an electric 

tool.  The machine was meka and she injured her third toe.  She also  states 

that at that time a month ago, she had a lateral shin wound that was also 

present.  She is unsure how she got that wound.  She managed the wounds on her 

own for approximately 6-1/2 months and they shrank in size and then would 

reappear, but never fully healed.  Approximately 1-1/2 months ago, the patient 

reports that she had swelling and redness that were intermittent and located 

only in her foot. The wound on her third digit was still present at that time.  





Approximately 1 week ago, the patient states that she woke up and her right 

pant leg was soaked through from weeping of the shin wound on the right lower 

extremity.  She sought medical attention  at that time and was given a 

prescription for clindamycin 300 mg q.i.d., which she has been taking as 

directed.  She was also instructed in managing the wound with wet-to-dry 

dressings at night and dry dressings during the day.  She did this for one 

week. 





She presented to the [facility] regarding her shin wound, which had increased 

in size over the last 1 week. The right toe wound remains the same size, but is 

also not healing and has an opening at the base of the toe.  The foot and shin 

had turned red and had started to swell.  Dr. Doyle directed her to the ER 

for admission. 





Prior to these wounds the patient reports about 1 year history of hip and 

buttock claudication. She estimates that prior to these wounds she could walk 

50 yards or less before she began experiencing hip and buttock pain that caused 

her to rest. If she kept walking then the pain would descend to involve her 

thighs and calves. She states the pain is fairly symmetric causing her to 

assume it was her back. She also reports occasional nighttime "tanika horses" 

that impoved when she got up or "shook her leg out". 





The patient denies chest pain, shortness of breath, headache, vision changes, 

abdominal pain, nausea, vomiting, diarrhea, constipation, or pain in her upper 

extremities or left lower extremity.  She denies fever, chills, warmth of the 

right lower extremity, color changes other than erythema to the right lower 

extremity.  





The patient reports her right arm and leg "going numb" after receiving IV 

contrast during an MRI scan at [facility].  She states she was observed for 

2 hours and was discharged to home after the symptoms went away.  She denies 

feeling short of breath or experiencing rash or hives.





PAST MEDICAL HISTORY:  None.





PAST SURGICAL HISTORY:   x1; lumbar spine L5-S1 surgery; cervical 

spine surgery, plates and screws; hysterectomy.





HOME MEDICATIONS:  Loratadine/pseudoephedrine 1 tab p.o. q.p.m.





ALLERGIES:  IODINATED CONTRAST, numbness; PENICILLIN, swelling of face, lips 

and throat; SULFA, swelling 


of face, lips and throat; PEANUTS, hives and throat swelling.


 


FAMILY HISTORY:  Father passed away of lung cancer.  Mother is healthy.  





SOCIAL HISTORY:  The patient smokes approximately a quarter of a pack per day 

for approximately 30 years.  She drinks less than 1 drink per week.  She is 

 and lives with her .  She has 


2 kids who live out of the house.  She takes care of her grandchildren.  





REVIEW OF SYSTEMS:  A 10-point review of systems was performed and all the 

pertinent positives and negatives 


are in the HPI.  All other systems are negative.





PHYSICAL EXAMINATION:





Patient sitting in bed following right anterior lower leg I&D.


NAD, AAO x 3


RRR, S1/S2


CTAB


Abd is soft, nontender


2+ pulses are palpated the bilateral radial and brachial arteries


Auscultation overlying the expected location of the aorta and iliac arteries 

does not reveal a bruit.


Neither common femoral artery or popliteal artery are palpable.  Pedal arteries 

are NONPALPABLE.


The anterior right lower leg is dressed with sterile gauze with an ice pack 

status post I&D.


The right forefoot is wrapped in sterile gauze as well.











 Selected Entries











  19





  11:21 11:27


 


Pulse Rate 116 


 


Heart Rate 116 


 


Respiratory  17





Rate  


 


Blood Pressure 220/111 





(mmHg)  


 


Blood Pressure 149 





Mean  


 


O2 Sat by Pulse 95 





Oximetry  








RELEVANT IMAGING:





Patient Name:                    JO ANN MARSH                                  

                                                                            

Medical Record#: [id number]


Ordering Physician: Michelle PRECIADO                                     

                                                                            

Acct.#: [id number]


:         1970                    Age: 48   Sex: F                    

                                                                            

Location: SURGICAL STAY UNIT


Exam Date: 19                                                       

                                                                            ADM 

Status: ADM Louis


Order Information:                                               VL ANK/

BRACHIAL INDICES


Accession Number:                                                [id number]


CPT:                                                             [id number]


INDICATION: Right lower extremity pain and nonhealing ulcers





COMPARISON: None.





TECHNIQUE: Ankle-brachial indices and Doppler tracings were obtained of the 

lower


extremities bilaterally. Volume pulse recordings were acquired at the bilateral 

ankles.





REPORT: 





Ankle-brachial indices:





Right:                      Value (SBP)                      Index


Brachial:                  162                                    


Posterior tibialis:     103                                        0.64


Dorsalis pedis:        88                                        0.54


Digit:                         41                                        0.25





Left:                        Value (SBP)                      Index


Brachial:                  157                                      


Posterior tibialis:     58                                       0.37


Dorsalis pedis:        53                                       0.33


Digit:                         79                                        0.49








Doppler waveforms (acquired at rest):


In the interrogated lower extremity arteries, Doppler waveforms are monophasic 

in all


distributions with noticeably reduced amplitude at the right dorsalis pedis and 

right


digit.





Volume pulse recordings (acquired at rest):


Volume pulse recordings, measured at the bilateral ankles, are symmetric. 








IMPRESSION: 


TEAGAN values are consistent with rest pain with lower values recorded in the left 

lower


extremity but more abnormal arterial waveforms recorded in the right lower 

extremity. In


the presence of nonhealing wounds catheter arteriography is likely indicated 

for superior


vascular characterization.





Findings were discussed over the telephone with Dr. Deng  at 1624 hours on 

2019.








____________________________________________________________


<Electronically signed by Rd Esquivel MD in OV>  19


Dictated By: Rd Esquivel MD


Dictated Date/Time: 19








LABS:





 Laboratory Tests











  19





  11:46 16:33 20:26


 


WBC   


 


RBC   


 


Hgb   


 


Hct   


 


BUN   


 


Creatinine   


 


Est GFR ( Amer)   


 


Est GFR (Non-Af Amer)   


 


Glucose   


 


POC Glucose (mg/dL)   160 H  258 H


 


Hemoglobin A1c  9.5 H  














  19





  06:33 06:33 09:23


 


WBC  10.4  


 


RBC  4.75  


 


Hgb  14.5  


 


Hct  43  


 


BUN   9 


 


Creatinine   0.43 L 


 


Est GFR ( Amer)   189.6 


 


Est GFR (Non-Af Amer)   156.7 


 


Glucose   211 H 


 


POC Glucose (mg/dL)    152 H


 


Hemoglobin A1c   














  19





  11:44


 


WBC 


 


RBC 


 


Hgb 


 


Hct 


 


BUN 


 


Creatinine 


 


Est GFR ( Amer) 


 


Est GFR (Non-Af Amer) 


 


Glucose 


 


POC Glucose (mg/dL)  171 H


 


Hemoglobin A1c 

















Impression:





Based on the recent TEAGAN values, the patient's reported clinical symptoms and 

physical exam findings I am suspecting at least aortoiliac occlusive disease.





Plan:


1.  Arterial duplex to include the lower abdominal aorta and iliac arteries 

will be ordered to better identify the location of the anticipated occlusions 

and stenoses and to determine the feasibility of endovascular repair.


2.  The patient has a questionable reaction to MRI contrast.  In light of this 

maximum imaging acquisition with ultrasound will be acquired and CO2 

arteriography will be employed when and if the patient goes to the angiography 

suite.


3.  The correlation between cigarette smoking and occlusive arterial disease 

was made clear to the patient and she was encouraged not to smoke cigarettes.


4.  If the patient stabilizes clinically with antibiotic therapy and supportive 

care, as well as diabetes management, likely her arteriogram can be performed 

as an outpatient.

## 2019-02-15 LAB
ALBUMIN SERPL BCG-MCNC: 3.3 G/DL (ref 3.2–5.2)
ALBUMIN/GLOB SERPL: 1 {RATIO} (ref 1–3)
ALP SERPL-CCNC: 59 U/L (ref 34–104)
ALT SERPL W P-5'-P-CCNC: 20 U/L (ref 7–52)
ANION GAP SERPL CALC-SCNC: 5 MMOL/L (ref 2–11)
AST SERPL-CCNC: 18 U/L (ref 13–39)
BASOPHILS # BLD AUTO: 0.1 10^3/UL (ref 0–0.2)
BUN SERPL-MCNC: 12 MG/DL (ref 6–24)
BUN/CREAT SERPL: 24.5 (ref 8–20)
CALCIUM SERPL-MCNC: 9.1 MG/DL (ref 8.6–10.3)
CHLORIDE SERPL-SCNC: 103 MMOL/L (ref 101–111)
EOSINOPHIL # BLD AUTO: 0.2 10^3/UL (ref 0–0.6)
GLOBULIN SER CALC-MCNC: 3.2 G/DL (ref 2–4)
GLUCOSE SERPL-MCNC: 162 MG/DL (ref 70–100)
HCO3 SERPL-SCNC: 28 MMOL/L (ref 22–32)
HCT VFR BLD AUTO: 41 % (ref 35–47)
HGB BLD-MCNC: 14.2 G/DL (ref 12–16)
LYMPHOCYTES # BLD AUTO: 2.3 10^3/UL (ref 1–4.8)
MCH RBC QN AUTO: 31 PG (ref 27–31)
MCHC RBC AUTO-ENTMCNC: 35 G/DL (ref 31–36)
MCV RBC AUTO: 91 FL (ref 80–97)
MONOCYTES # BLD AUTO: 0.7 10^3/UL (ref 0–0.8)
NEUTROPHILS # BLD AUTO: 8.1 10^3/UL (ref 1.5–7.7)
NRBC # BLD AUTO: 0 10^3/UL
NRBC BLD QL AUTO: 0.1
PLATELET # BLD AUTO: 238 10^3/UL (ref 150–450)
POTASSIUM SERPL-SCNC: 3.6 MMOL/L (ref 3.5–5)
PROT SERPL-MCNC: 6.5 G/DL (ref 6.4–8.9)
RBC # BLD AUTO: 4.53 10^6/UL (ref 4–5.4)
SODIUM SERPL-SCNC: 136 MMOL/L (ref 135–145)
WBC # BLD AUTO: 11.4 10^3/UL (ref 3.5–10.8)

## 2019-02-15 RX ADMIN — INSULIN LISPRO SCH UNITS: 100 INJECTION, SOLUTION INTRAVENOUS; SUBCUTANEOUS at 10:50

## 2019-02-15 RX ADMIN — PSEUDOEPHEDRINE HCL SCH MG: 120 TABLET, FILM COATED, EXTENDED RELEASE ORAL at 21:39

## 2019-02-15 RX ADMIN — INSULIN LISPRO SCH UNITS: 100 INJECTION, SOLUTION INTRAVENOUS; SUBCUTANEOUS at 17:52

## 2019-02-15 RX ADMIN — INSULIN LISPRO SCH UNITS: 100 INJECTION, SOLUTION INTRAVENOUS; SUBCUTANEOUS at 21:52

## 2019-02-15 RX ADMIN — CEFTRIAXONE SODIUM SCH MLS/HR: 1 INJECTION, POWDER, FOR SOLUTION INTRAVENOUS at 16:21

## 2019-02-15 RX ADMIN — METFORMIN HYDROCHLORIDE SCH MG: 500 TABLET, FILM COATED ORAL at 16:22

## 2019-02-15 RX ADMIN — LORATADINE SCH MG: 10 TABLET ORAL at 21:39

## 2019-02-15 RX ADMIN — IBUPROFEN PRN MG: 800 TABLET ORAL at 13:59

## 2019-02-15 RX ADMIN — VANCOMYCIN HYDROCHLORIDE SCH MLS/HR: 1 INJECTION, POWDER, LYOPHILIZED, FOR SOLUTION INTRAVENOUS at 17:51

## 2019-02-15 RX ADMIN — INSULIN LISPRO SCH UNITS: 100 INJECTION, SOLUTION INTRAVENOUS; SUBCUTANEOUS at 13:57

## 2019-02-15 RX ADMIN — DOCUSATE SODIUM SCH MG: 100 CAPSULE, LIQUID FILLED ORAL at 21:40

## 2019-02-15 RX ADMIN — VANCOMYCIN HYDROCHLORIDE SCH MLS/HR: 1 INJECTION, POWDER, LYOPHILIZED, FOR SOLUTION INTRAVENOUS at 23:33

## 2019-02-15 RX ADMIN — TRAMADOL HYDROCHLORIDE PRN MG: 50 TABLET, FILM COATED ORAL at 10:50

## 2019-02-15 RX ADMIN — SODIUM CHLORIDE, SODIUM LACTATE, POTASSIUM CHLORIDE, AND CALCIUM CHLORIDE SCH MLS/HR: 600; 310; 30; 20 INJECTION, SOLUTION INTRAVENOUS at 08:00

## 2019-02-15 RX ADMIN — ENOXAPARIN SODIUM SCH MG: 40 INJECTION SUBCUTANEOUS at 17:51

## 2019-02-15 RX ADMIN — TRAMADOL HYDROCHLORIDE PRN MG: 50 TABLET, FILM COATED ORAL at 05:25

## 2019-02-15 RX ADMIN — METOPROLOL TARTRATE SCH MG: 25 TABLET, FILM COATED ORAL at 21:39

## 2019-02-15 RX ADMIN — METFORMIN HYDROCHLORIDE SCH MG: 500 TABLET, FILM COATED ORAL at 10:50

## 2019-02-15 RX ADMIN — TRAMADOL HYDROCHLORIDE PRN MG: 50 TABLET, FILM COATED ORAL at 21:52

## 2019-02-15 RX ADMIN — VANCOMYCIN HYDROCHLORIDE SCH MLS/HR: 1 INJECTION, POWDER, LYOPHILIZED, FOR SOLUTION INTRAVENOUS at 12:39

## 2019-02-15 RX ADMIN — DOCUSATE SODIUM SCH MG: 100 CAPSULE, LIQUID FILLED ORAL at 16:22

## 2019-02-15 RX ADMIN — VANCOMYCIN HYDROCHLORIDE SCH MLS/HR: 1 INJECTION, POWDER, LYOPHILIZED, FOR SOLUTION INTRAVENOUS at 05:26

## 2019-02-15 NOTE — PN
Progress Note





- Progress Note


Date of Service: 02/15/19


SOAP: 


Subjective:


Resting in bed with  at side. 


Patient without significant pain now that wound vac is applied. 











Objective:





 Laboratory Tests











  02/13/19 02/14/19 02/15/19





  11:46 06:33 05:26


 


WBC  12.0 H  10.4 


 


BUN    12


 


Creatinine    0.49 L


 


Est GFR ( Amer)    163.1


 


Est GFR (Non-Af Amer)    134.8














  02/15/19





  05:31


 


WBC  11.4 H


 


BUN 


 


Creatinine 


 


Est GFR ( Amer) 


 


Est GFR (Non-Af Amer) 











 Selected Entries











  02/15/19 02/15/19





  10:20 10:50


 


Temperature 98.0 F 


 


Temperature Oral 





Source  


 


Pulse Rate 88 


 


Respiratory  16





Rate  


 


Blood Pressure 157/61 





(mmHg)  


 


Blood Pressure 84 





Mean  


 


O2 Sat by Pulse 96 





Oximetry  


 


Patient on Room Yes 





Air  








NAD, AAO x 3


No pulses palpable at B/L CFA





Assessment:





48 YOF with right leg critical limb ischemia discovered to have bilateral CAITLYN/

EIA arterial occlusion on arterial duplex. 





I reviewed today's ultrasound images with the patient and her  and told 

them it is feasible to try to revascularize her occluded iliac arteries 

percutaneously from the bilateral common femoral arteries. If we can cross the 

occlusions then she will most likely have the occluded arteries stented. 





Revascularization will greatly aid her left leg wounds and potentially obviate 

right toe amputation. 








Plan:


1. The patient seems to be stable s/p right anterior leg I&D, wound vac 

application and antibiotic therapy. The angiographic procedure can be done as 

an outpatient. I will try to get her on the schedule within a week. 


2. Continue antibiotic therapy and wound care. 


3. Patient again encouraged to stop smoking and to engage in diabetes care.

## 2019-02-15 NOTE — CONS
CONSULTATION REPORT:

 

DATE OF CONSULT:  02/15/19

 

REQUESTING PHYSICIAN:  Dr. Doyle.

 

CONSULTING SERVICE:  Infectious Disease.

 

REASON FOR CONSULT:  Right leg wound and right toe infection.

 

IMPRESSION:

1.  Necrotizing soft tissue infection, right lower anterior leg, status post 
debridement down to fascia, which was healthy.  Gram stain from that procedure 
shows gram-positive cocci.  The Staph aureus PCR was negative.  The culture is 
pending.

2.  Right third toe chronic nonhealing non-pressure related wound, debridement 
done to bone.  MRI shows osteomyelitis.  The Gram stain from unroofing the 
eschar yesterday shows gram-positive cocci and gram-positive bacilli.

3.  New diagnosis of type 2 diabetes mellitus.  A1c here is 9 plus.

4.  Peripheral vascular disease.

5.  Tobacco abuse, in brief remission.

6.  PENICILLIN allergy, which caused hives and lip swelling.

 

RECOMMENDATIONS:  Continue vancomycin and ceftriaxone, which she is tolerating 
well, and we will await the wound cultures.  She will need a lengthy course of 
IV antibiotics for the toe infection, which we could arrange at [facility].

 

HISTORY OF PRESENT ILLNESS:  This is a 48-year-old woman with vascular disease, 
undiagnosed diabetes until now, admitted with a right leg infection.  A few 
weeks ago, a power  mishap led to a wound on her dorsal surface of 
the right third toe.  She has been putting various salves on it to no avail.  
The right leg wound developed after a pinhole that kept getting bigger and 
bigger a couple of weeks ago.  It was painful.  There was a large scab.  
Because of those issues, she saw Dr. Doyle [facility], who 
admitted her to the hospital on 19.  MRI findings showed osteomyelitis of 
the right third toe and a large abscess under the eschar on the right leg.  He 
took her to the operating room yesterday for incision and debridement of both 
sites, which she tolerated well.  He is going to place a VAC dressing on her 
this morning.  At rest, she does not have any pain, but with moving around and 
manipulating the dressing, she is quite tender.  She has had no fevers here.

 

PAST MEDICAL HISTORY:

1.  Type 2 diabetes mellitus (diagnosed here).

2.  Peripheral vascular disease.

3.  Tobacco abuse.

 

PAST SURGICAL HISTORY:

1.  History of .

2.  L5-S1 decompression.

3.  Cervical spine surgery with fixation.

4.  Status post hysterectomy.

 

ALLERGIES:  CONTRAST DYE caused numbness, PENICILLIN and SULFA caused swelling 
and hives.

 

MEDICATIONS:

1.  Tylenol.

2.  Enoxaparin.

3.  Ibuprofen as needed.

4.  Ketorolac.

5.  Metformin.

6.  Pseudoephedrine.

7.  Ceftriaxone 1 g daily.

8.  Scopolamine patch.

9.  Vancomycin 1 g IV every 6 hours.

 

SOCIAL HISTORY:  She lives in [place] with her family.  She is a smoker.  
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

 

FAMILY HISTORY:  Father  from lung cancer.  Mother is alive and healthy.

 

REVIEW OF SYSTEMS:  A 14-point review is all negative except as noted above in 
the history of present illness.

 

PHYSICAL EXAM:  Vital Signs:  Temperature 37, heart rate 80, respiratory rate 16
, blood pressure 130/60, oxygen saturation 93% on room air.  In general, she is 
awake, not in distress.  Neurologic:  She is oriented x3.  Follows all 
commands. HEENT:  There is no conjunctival hemorrhage.  Oropharynx without 
lesions.  Neck is supple without mass.  Heart is regular rate and rhythm 
without murmurs, rubs, or gallops. Lungs are clear to auscultation bilaterally.
  Abdomen:  Soft, nontender, nondistended.  There are bowel sounds present.  
Skin:  There is no rash or splinter hemorrhage.  Musculoskeletal:  There is no 
spine tenderness to palpation.  Right lower anterior leg, there is about a 6 cm
, open, well-demarcated wound with exposed muscle and tendon.  There is no 
necrotic tissue left.  There is surrounding rim of erythema.  The right third 
toe, there is a dorsal surface wound of about a centimeter, which is irregular 
with underlying granulation tissue.

 

LABORATORY DATA:  Creatinine 0.49.  CRP was 15 at admission.  White blood cell 
count 11, hemoglobin 14, platelets 238.

 

Please see impressions and recommendations outlined above, which I have 
discussed with Dr. Doyle.

 

Thanks for asking me to see Ms. Salma in consultation.

 

 [id number]/[facility] #: [id number]

YOSEF

## 2019-02-15 NOTE — PN
Progress Note





- Progress Note


Date of Service: 02/15/19


SOAP: 


Subjective:





Hungry


Pain in right leg slightly improved








Objective:


 











Temp Pulse Resp BP Pulse Ox


 


 98.5 F   86   18   129/58   93 


 


 02/15/19 03:28  02/15/19 03:28  02/15/19 05:25  02/15/19 03:28  02/15/19 03:28








PEX:





Right shin abscess/ulcer site clean and pink-no odor or purulence. Some 

surrounding erythema


Right 3rd toe open ulcer with exposed bone. Some seropurulent drainage. No 

swelling.


Edema decreased in right ankle and foot








Assessment:


Ulcer/Abscess right shin s/p I and D and debridement


Right third toe with ulcer and presumed osteo


PVD by US


DM-new diagnosis


Tobacco abuse





Plan:


Wound vac applied to shin ulcer


Wound care to 3rd toe


IV abx


Await cultures


ID consult


Ortho consult


Vascular IR consult-discussed with Dr. Esquivel

## 2019-02-15 NOTE — PN
Subjective


Date of Service: 02/15/19


Interval History: 





Pt states that pain in R leg is 4/10.  She states she is dealing with the pain 

well.  She denies n/v since last night around 1730.  She ate ice cream around 

2330, and then was NPO after midnight for US.  She has had both Toradol and 

Tramadol, and appears to have tolerated both well, although she has yet to have 

breakfast.  





She denies CP , SOB, cough, fever/chills, abd pain, n/v/d/c, pain in extremities

, calf tenderness.








Objective


Active Medications: 








Acetaminophen (Tylenol Tab*)  650 mg PO Q4H PRN


Dextrose (D50w Syringe 50 Ml*)  12.5 gm IV PUSH .FOR FS < 60 - SS PRN


Enoxaparin Sodium (Lovenox(*))  40 mg SUBCUT Q24H [facility]


Ceftriaxone Sodium 1 gm/ (Sodium Chloride)  50 mls @ 200 mls/hr IVPB Q24H [facility]


Lactated Ringer's (Lactated Ringers 1000 Ml Bag*)  1,000 mls @ 125 mls/hr IV 

PER RATE [facility]


Vancomycin HCl 1,000 mg/ (Sodium Chloride)  250 mls @ 166.667 mls/hr IVPB Q6H 

[facility]


Ibuprofen (Motrin Tab*)  800 mg PO Q6H PRN


Insulin Human Lispro (Humalog*)  0 units SUBCUT ACHS [facility]; Protocol


Ketorolac Tromethamine (Toradol Inj*)  30 mg IV PUSH Q6H PRN


Loratadine (Claritin Tab(Nf))  10 mg PO 1800 [facility]


Metformin HCl (Glucophage*)  500 mg PO 0800,1700 [facility]


Pharmacy Consult (Vancomycin Per Pharmacy*)  1 note FOLLOW UP .VANC PER 

PHARMACY [facility]


Pharmacy Profile Note (Vancomycin Trough Check)  1 note FOLLOW UP 1100 ONE


Prochlorperazine Edisylate (Compazine Inj*)  5 mg IV Q6H PRN


Pseudoephedrine HCl (Sudafed 12 Hour*)  120 mg PO QPM [facility]


Scopolamine (Transderm-Scop 1.5 Mg Patch*)  1 patch TRANSDERM Q72H PRN


Tramadol HCl (Ultram*)  50 mg PO Q6H PRN








Vital Signs:











Temp Pulse Resp BP Pulse Ox


 


 98.5 F   86   18   129/58   93 


 


 02/15/19 03:28  02/15/19 03:28  02/15/19 05:25  02/15/19 03:28  02/15/19 03:28











Oxygen Devices in Use Now: Nasal Cannula


Appearance: Pt is sitting up in bed with RLE elevated.  She is in no acute 

distress and appears well.


Eyes: No Scleral Icterus, PERRLA


Ears/Nose/Mouth/Throat: NL Teeth, Lips, Gums, Clear Oropharnyx, Mucous 

Membranes Moist


Neck: NL Appearance and Movements; NL JVP, Trachea Midline, No Thyroid 

Enlargement, Masses


Respiratory: Symmetrical Chest Expansion and Respiratory Effort, Clear to 

Auscultation


Cardiovascular: NL Sounds; No Murmurs; No JVD, RRR


Abdominal: NL Sounds; No Tenderness; No Distention, No Hepatosplenomegaly


Lymphatic: No Cervical Adenopathy


Extremities: No Clubbing, Cyanosis, - - LLE WNL.  R shin, R distal foot bandaged

; dressing is CDI.  Pedal pulses not palpable.


Neurological: Alert and Oriented x 3


Result Diagrams: 


 02/15/19 05:31





 02/15/19 05:26


Additional Lab and Data: 


 Lab Results











  02/13/19 02/13/19 Range/Units





  11:46 11:46 


 


WBC  12.0 H   (3.5-10.8)  10^3/ul


 


RBC  5.08   (4.00-5.40)  10^6/ul


 


Hgb  15.8   (12.0-16.0)  g/dl


 


Hct  46   (35-47)  %


 


MCV  90   (80-97)  fL


 


MCH  31   (27-31)  pg


 


MCHC  35   (31-36)  g/dl


 


RDW  12   (10.5-15)  %


 


Plt Count  301   (150-450)  10^3/ul


 


MPV  9.8   (7.4-10.4)  fL


 


Neut % (Auto)  72.8   %


 


Lymph % (Auto)  19.4   %


 


Mono % (Auto)  5.4   %


 


Eos % (Auto)  1.9   %


 


Baso % (Auto)  0.5   %


 


Absolute Neuts (auto)  8.8 H   (1.5-7.7)  10^3/ul


 


Absolute Lymphs (auto)  2.3   (1.0-4.8)  10^3/ul


 


Absolute Monos (auto)  0.6   (0-0.8)  10^3/ul


 


Absolute Eos (auto)  0.2   (0-0.6)  10^3/ul


 


Absolute Basos (auto)  0.1   (0-0.2)  10^3/ul


 


Absolute Nucleated RBC  0   10^3/ul


 


Nucleated RBC %  0   


 


ESR  Pending   


 


Sodium   133 L  (135-145)  mmol/L


 


Potassium   4.1  (3.5-5.0)  mmol/L


 


Chloride   103  (101-111)  mmol/L


 


Carbon Dioxide   22  (22-32)  mmol/L


 


Anion Gap   8  (2-11)  mmol/L


 


BUN   12  (6-24)  mg/dL


 


Creatinine   0.63  (0.51-0.95)  mg/dL


 


Est GFR ( Amer)   122.0  (>60)  


 


Est GFR (Non-Af Amer)   100.9  (>60)  


 


BUN/Creatinine Ratio   19.0  (8-20)  


 


Glucose   291 H  ()  mg/dL


 


Calcium   9.4  (8.6-10.3)  mg/dL


 


C-Reactive Protein   15.78 H  (<8.01)  mg/L











Microbiology and Other Data: 


 Microbiology











 02/13/19 20:51 Aerobic Blood Culture - Preliminary





 Blood Venous    No Growth Day 1





 Anaerobic Blood Culture - Preliminary





    No Growth Day 1


 


 02/13/19 17:09 Aerobic Blood Culture - Preliminary





 Blood Venous    No Growth Day 1





 Anaerobic Blood Culture - Preliminary





    No Growth Day 1


 


 02/14/19 11:00 Skin and Soft Tissue MRSA/MSSA (PCR - Final





 Toe    Mrsa Negative





    S.aureus Negative





 Gram Stain - Final


 


 02/14/19 11:00 Skin and Soft Tissue MRSA/MSSA (PCR - Final





 Leg Right    Mrsa Negative





    S.aureus Negative





 Gram Stain - Final














Assess/Plan/Problems-Billing


Assessment: 





Pt is a 48yof with no reported PMHx who presents with nonhealing wound of RLE, 

osteomyelitis of 3rd digit of RLE, new onset DM, and PAD.








- Patient Problems


(1) Osteomyelitis


Comment: 


-R third toe ulcer debrided yesterday; PT ordered


-Ortho consult ordered


-ID suggests continue vanco, ceftriaxone and await cultures   





(2) Nonhealing nonsurgical wound


Comment: 


-Wound vac started today


-Await cultures


-Continue to monitor with surg, IR   





(3) Peripheral artery disease


Comment: 


-TEAGAN results suggest severe PAD


-Awaiting results of US


-IR recommends revascularization at outpatient; please follow up within 1 week d

/c   





(4) Hypertension


Comment: 


-Pt appears to be hypertensive, despite reporting adequate pain control


-Metoprolol 12.5 bid with hold parameters


-Continue to monitor   





(5) Nausea & vomiting


Comment: 


-N/v appears to be resolved; likely due to opiates


-Continue PRN nausea medication


-Ketorolac ordered 


-D/c opiates   





(6) Diabetes


Comment: 


-Diabetic education, nutritional consult


-Metformin 500 BID starting today; continue SS lipso


-Continue to monitor AC FS   





(7) DVT prophylaxis


Comment: 


-Lovenox   





(8) Full code status





Status and Disposition: 





Inpatient.  Discharge when stable.

## 2019-02-15 NOTE — CONSULT
Consult


Consult: 


Orthopedic Consultation:





CC: Right third toe chronic ulcer and right anterior lower leg wound and 

possible need for 3rd toe amputation





HPI: Brandy is a 48 year old female with no significant know past medical 

history who was admitted from wound care center for non healing wounds right 

leg and right 3rd toe.  She was found on admission to have elevated A1C >9 and 

evidence of PVD.  Workup with MRI and consultation with IR, Dr. Esquivel has 

indicated evidence of osteomyelitis of the third toe and occlusion bilateral 

common and external iliac arteries.  She has had wound debridement of both by 

Dr. Doyle on 2/14/19.


She had a wound vac placed on the leg wound today and dressing change at the 

toe.  She has been seen as well by Dr. Groves and IV Vanco and Ceftriaxone 

are ordered.  Her pain is well managed overall and feels better with the wound 

vac now placed.  








Acetaminophen (Tylenol Tab*)  650 mg PO Q4H PRN


   PRN Reason: FEVER/PAIN


   Last Admin: 02/13/19 23:46 Dose:  650 mg


Dextrose (D50w Syringe 50 Ml*)  12.5 gm IV PUSH .FOR FS < 60 - SS PRN


   PRN Reason: FS < 60


Docusate Sodium (Colace Cap*)  100 mg PO BID [facility]


Enoxaparin Sodium (Lovenox(*))  40 mg SUBCUT Q24H [facility]


   Last Admin: 02/14/19 18:44 Dose:  40 mg


Ceftriaxone Sodium 1 gm/ (Sodium Chloride)  50 mls @ 200 mls/hr IVPB Q24H [facility]


   Last Admin: 02/14/19 15:50 Dose:  200 mls/hr


Vancomycin HCl 1,000 mg/ (Sodium Chloride)  250 mls @ 166.667 mls/hr IVPB Q6H 

[facility]


   Last Admin: 02/15/19 12:39 Dose:  166.667 mls/hr


Ibuprofen (Motrin Tab*)  800 mg PO Q6H PRN


   PRN Reason: PAIN


   Last Admin: 02/15/19 13:59 Dose:  800 mg


Insulin Human Lispro (Humalog*)  0 units SUBCUT ACHS [facility]; Protocol


   Last Admin: 02/15/19 13:57 Dose:  4 units


Ketorolac Tromethamine (Toradol Inj*)  30 mg IV PUSH Q6H PRN


   PRN Reason: PAIN


   Last Admin: 02/15/19 00:17 Dose:  30 mg


Loratadine (Claritin Tab(Nf))  10 mg PO 1800 [facility]


   Last Admin: 02/14/19 19:00 Dose:  Not Given


Magnesium Hydroxide (Milk Of Magnesia Liq*)  30 ml PO Q12H PRN


   PRN Reason: CONSTIPATION


Metformin HCl (Glucophage*)  500 mg PO 0800,1700 [facility]


   Last Admin: 02/15/19 10:50 Dose:  500 mg


Pharmacy Consult (Vancomycin Per Pharmacy*)  1 note FOLLOW UP .VANC PER 

PHARMACY [facility]


Pharmacy Profile Note (Vancomycin Trough Check)  1 note FOLLOW UP ONCE ONE


   Stop: 02/18/19 05:01


Prochlorperazine Edisylate (Compazine Inj*)  5 mg IV Q6H PRN


   PRN Reason: NAUSEA/VOMITING


   Last Admin: 02/14/19 17:30 Dose:  5 mg


Pseudoephedrine HCl (Sudafed 12 Hour*)  120 mg PO QPM [facility]


   Last Admin: 02/14/19 19:00 Dose:  Not Given


Scopolamine (Transderm-Scop 1.5 Mg Patch*)  1 patch TRANSDERM Q72H PRN


   PRN Reason: NAUSEA/VOMITING


Tramadol HCl (Ultram*)  50 mg PO Q6H PRN


   PRN Reason: moderate pain


   Last Admin: 02/15/19 10:50 Dose:  50 mg


 Vital Signs











Temp  98.0 F   02/15/19 10:20


 


Pulse  88   02/15/19 10:20


 


Resp  16   02/15/19 10:50


 


BP  157/61   02/15/19 10:20


 


Pulse Ox  96   02/15/19 10:20








 Intake & Output











 02/14/19 02/15/19 02/15/19





 18:59 06:59 18:59


 


Intake Total 1100 1345 2153


 


Output Total 300 1200 650


 


Balance 


 


Intake:   


 


  IV Fluids 


 


    LR  975 1200


 


    lr 800  


 


  IVPB 300 250 503


 


    ABX - VANCOMYCIN 300 250 503


 


  Oral 0 120 450


 


Output:   


 


  Urine 300 1200 650








 Laboratory Results - last 24 hr











  02/14/19 02/14/19 02/14/19





  15:44 16:59 21:08


 


WBC   


 


RBC   


 


Hgb   


 


Hct   


 


MCV   


 


MCH   


 


MCHC   


 


RDW   


 


Plt Count   


 


MPV   


 


Neut % (Auto)   


 


Lymph % (Auto)   


 


Mono % (Auto)   


 


Eos % (Auto)   


 


Baso % (Auto)   


 


Absolute Neuts (auto)   


 


Absolute Lymphs (auto)   


 


Absolute Monos (auto)   


 


Absolute Eos (auto)   


 


Absolute Basos (auto)   


 


Absolute Nucleated RBC   


 


Nucleated RBC %   


 


Sodium   


 


Potassium   


 


Chloride   


 


Carbon Dioxide   


 


Anion Gap   


 


BUN   


 


Creatinine   


 


Est GFR ( Amer)   


 


Est GFR (Non-Af Amer)   


 


BUN/Creatinine Ratio   


 


Glucose   


 


POC Glucose (mg/dL)   129 H  228 H


 


Hemoglobin A1c   


 


Calcium   


 


Total Bilirubin   


 


AST   


 


ALT   


 


Alkaline Phosphatase   


 


Total Protein   


 


Albumin   


 


Globulin   


 


Albumin/Globulin Ratio   


 


Vancomycin Trough  7.0  














  02/15/19 02/15/19 02/15/19





  05:26 05:31 05:31


 


WBC   11.4 H 


 


RBC   4.53 


 


Hgb   14.2 


 


Hct   41 


 


MCV   91 


 


MCH   31 


 


MCHC   35 


 


RDW   13 


 


Plt Count   238 


 


MPV   9.3 


 


Neut % (Auto)   71.5 


 


Lymph % (Auto)   20.3 


 


Mono % (Auto)   6.2 


 


Eos % (Auto)   1.3 


 


Baso % (Auto)   0.7 


 


Absolute Neuts (auto)   8.1 H 


 


Absolute Lymphs (auto)   2.3 


 


Absolute Monos (auto)   0.7 


 


Absolute Eos (auto)   0.2 


 


Absolute Basos (auto)   0.1 


 


Absolute Nucleated RBC   0 


 


Nucleated RBC %   0.1 


 


Sodium  136  


 


Potassium  3.6  


 


Chloride  103  


 


Carbon Dioxide  28  


 


Anion Gap  5  


 


BUN  12  


 


Creatinine  0.49 L  


 


Est GFR ( Amer)  163.1  


 


Est GFR (Non-Af Amer)  134.8  


 


BUN/Creatinine Ratio  24.5 H  


 


Glucose  162 H  


 


POC Glucose (mg/dL)   


 


Hemoglobin A1c    9.5 H


 


Calcium  9.1  


 


Total Bilirubin  0.60  


 


AST  18  


 


ALT  20  


 


Alkaline Phosphatase  59  


 


Total Protein  6.5  


 


Albumin  3.3  


 


Globulin  3.2  


 


Albumin/Globulin Ratio  1.0  


 


Vancomycin Trough   














  02/15/19 02/15/19 02/15/19





  09:08 10:57 12:23


 


WBC   


 


RBC   


 


Hgb   


 


Hct   


 


MCV   


 


MCH   


 


MCHC   


 


RDW   


 


Plt Count   


 


MPV   


 


Neut % (Auto)   


 


Lymph % (Auto)   


 


Mono % (Auto)   


 


Eos % (Auto)   


 


Baso % (Auto)   


 


Absolute Neuts (auto)   


 


Absolute Lymphs (auto)   


 


Absolute Monos (auto)   


 


Absolute Eos (auto)   


 


Absolute Basos (auto)   


 


Absolute Nucleated RBC   


 


Nucleated RBC %   


 


Sodium   


 


Potassium   


 


Chloride   


 


Carbon Dioxide   


 


Anion Gap   


 


BUN   


 


Creatinine   


 


Est GFR ( Amer)   


 


Est GFR (Non-Af Amer)   


 


BUN/Creatinine Ratio   


 


Glucose   


 


POC Glucose (mg/dL)  150 H   238 H


 


Hemoglobin A1c   


 


Calcium   


 


Total Bilirubin   


 


AST   


 


ALT   


 


Alkaline Phosphatase   


 


Total Protein   


 


Albumin   


 


Globulin   


 


Albumin/Globulin Ratio   


 


Vancomycin Trough   13.2 








 Microbiology











 02/14/19 11:00 Skin and Soft Tissue MRSA/MSSA (PCR - Final





 Leg Right    Mrsa Negative





    S.aureus Negative





 Gram Stain - Final





 Wound Culture - Preliminary





    Strep Agalactiae - (Group B)


 


 02/14/19 11:00 Anaerobic Culture - Preliminary





 Wound - Right Third 


 


 02/14/19 11:00 Anaerobic Culture - Preliminary





 Wound - Right Leg 


 


 02/13/19 20:51 Aerobic Blood Culture - Preliminary





 Blood Venous    No Growth Day 1





 Anaerobic Blood Culture - Preliminary





    No Growth Day 1


 


 02/13/19 17:09 Aerobic Blood Culture - Preliminary





 Blood Venous    No Growth Day 1





 Anaerobic Blood Culture - Preliminary





    No Growth Day 1


 


 02/14/19 11:00 Skin and Soft Tissue MRSA/MSSA (PCR - Final





 Toe    Mrsa Negative





    S.aureus Negative





 Gram Stain - Final








right lower leg wound vac placed


right 3rd toe ulcer , pea sized,  with exposed bone at the distal phalanx 

dorsally without purulent drainage or odor, tenderness diffusely at tip of toe, 

no gross erythema, mild edema 





A: Right lower extremity chronic ulcer right 3rd toe with probable osteomyelitis


    Right anterior lower leg wound s/p I&D with wound vac placement





P:  She will continue with IV antibiotics as outlined by Dr. Yaneli Esquivel feels that with outpatient revascularization she may be able to 

heal the wounds and clear infection-scheduled for this week


    Will follow loosely, may follow up in office with Dr. Bradley or Dr. Rosen in clinic to monitor wound healing.

## 2019-02-16 RX ADMIN — IBUPROFEN PRN MG: 800 TABLET ORAL at 15:40

## 2019-02-16 RX ADMIN — VANCOMYCIN HYDROCHLORIDE SCH MLS/HR: 1 INJECTION, POWDER, LYOPHILIZED, FOR SOLUTION INTRAVENOUS at 17:48

## 2019-02-16 RX ADMIN — INSULIN LISPRO SCH UNITS: 100 INJECTION, SOLUTION INTRAVENOUS; SUBCUTANEOUS at 08:44

## 2019-02-16 RX ADMIN — METFORMIN HYDROCHLORIDE SCH MG: 500 TABLET, FILM COATED ORAL at 17:48

## 2019-02-16 RX ADMIN — CEFTRIAXONE SODIUM SCH MLS/HR: 1 INJECTION, POWDER, FOR SOLUTION INTRAVENOUS at 15:40

## 2019-02-16 RX ADMIN — VANCOMYCIN HYDROCHLORIDE SCH MLS/HR: 1 INJECTION, POWDER, LYOPHILIZED, FOR SOLUTION INTRAVENOUS at 23:34

## 2019-02-16 RX ADMIN — VANCOMYCIN HYDROCHLORIDE SCH MLS/HR: 1 INJECTION, POWDER, LYOPHILIZED, FOR SOLUTION INTRAVENOUS at 05:09

## 2019-02-16 RX ADMIN — INSULIN LISPRO SCH UNITS: 100 INJECTION, SOLUTION INTRAVENOUS; SUBCUTANEOUS at 12:44

## 2019-02-16 RX ADMIN — IBUPROFEN PRN MG: 800 TABLET ORAL at 05:06

## 2019-02-16 RX ADMIN — METOPROLOL TARTRATE SCH MG: 25 TABLET, FILM COATED ORAL at 21:54

## 2019-02-16 RX ADMIN — DOCUSATE SODIUM SCH: 100 CAPSULE, LIQUID FILLED ORAL at 22:57

## 2019-02-16 RX ADMIN — METFORMIN HYDROCHLORIDE SCH MG: 500 TABLET, FILM COATED ORAL at 08:43

## 2019-02-16 RX ADMIN — INSULIN LISPRO SCH: 100 INJECTION, SOLUTION INTRAVENOUS; SUBCUTANEOUS at 22:57

## 2019-02-16 RX ADMIN — INSULIN LISPRO SCH UNITS: 100 INJECTION, SOLUTION INTRAVENOUS; SUBCUTANEOUS at 17:55

## 2019-02-16 RX ADMIN — VANCOMYCIN HYDROCHLORIDE SCH MLS/HR: 1 INJECTION, POWDER, LYOPHILIZED, FOR SOLUTION INTRAVENOUS at 11:56

## 2019-02-16 RX ADMIN — METOPROLOL TARTRATE SCH MG: 25 TABLET, FILM COATED ORAL at 08:43

## 2019-02-16 RX ADMIN — PSEUDOEPHEDRINE HCL SCH MG: 120 TABLET, FILM COATED, EXTENDED RELEASE ORAL at 17:48

## 2019-02-16 RX ADMIN — LORATADINE SCH MG: 10 TABLET ORAL at 17:48

## 2019-02-16 RX ADMIN — DOCUSATE SODIUM SCH: 100 CAPSULE, LIQUID FILLED ORAL at 08:45

## 2019-02-16 RX ADMIN — ENOXAPARIN SODIUM SCH MG: 40 INJECTION SUBCUTANEOUS at 17:54

## 2019-02-16 NOTE — PN
Subjective


Date of Service: 19


Interval History: 





Patient seen and examined. Feeling well, has some pain to right shin, but 

otherwise denies fevers or chills, no SOB, no chest pain or headaches. Trying 

to watch her meals closely in light of new diabetes.





Objective


Active Medications: 








Acetaminophen (Tylenol Tab*)  650 mg PO Q4H PRN


   PRN Reason: FEVER/PAIN


   Last Admin: 19 23:46 Dose:  650 mg


Dextrose (D50w Syringe 50 Ml*)  12.5 gm IV PUSH .FOR FS < 60 - SS PRN


   PRN Reason: FS < 60


Docusate Sodium (Colace Cap*)  100 mg PO BID [facility]


   Last Admin: 19 08:45 Dose:  Not Given


Enoxaparin Sodium (Lovenox(*))  40 mg SUBCUT Q24H [facility]


   Last Admin: 02/15/19 17:51 Dose:  40 mg


Ceftriaxone Sodium 1 gm/ (Sodium Chloride)  50 mls @ 200 mls/hr IVPB Q24H [facility]


   Last Admin: 19 15:40 Dose:  200 mls/hr


Vancomycin HCl 1,000 mg/ (Sodium Chloride)  250 mls @ 166.667 mls/hr IVPB Q6H 

[facility]


   Last Admin: 19 11:56 Dose:  166.667 mls/hr


Ibuprofen (Motrin Tab*)  800 mg PO Q6H PRN


   PRN Reason: PAIN


   Last Admin: 19 15:40 Dose:  800 mg


Insulin Human Lispro (Humalog*)  0 units SUBCUT ACHS [facility]; Protocol


   Last Admin: 19 12:44 Dose:  1 units


Ketorolac Tromethamine (Toradol Inj*)  30 mg IV PUSH Q6H PRN


   PRN Reason: PAIN


   Last Admin: 02/15/19 00:17 Dose:  30 mg


Loratadine (Claritin Tab(Nf))  10 mg PO 1800 [facility]


   Last Admin: 02/15/19 21:39 Dose:  10 mg


Magnesium Hydroxide (Milk Of Magnesia Liq*)  30 ml PO Q12H PRN


   PRN Reason: CONSTIPATION


Metformin HCl (Glucophage*)  500 mg PO 0800,1700 [facility]


   Last Admin: 19 08:43 Dose:  500 mg


Metoprolol Tartrate (Lopressor Tab*)  12.5 mg PO Q12HR [facility]


   Last Admin: 19 08:43 Dose:  12.5 mg


Pharmacy Consult (Vancomycin Per Pharmacy*)  1 note FOLLOW UP .VANC PER 

PHARMACY [facility]


Pharmacy Profile Note (Vancomycin Trough Check)  1 note FOLLOW UP ONCE ONE


   Stop: 19 05:01


Prochlorperazine Edisylate (Compazine Inj*)  5 mg IV Q6H PRN


   PRN Reason: NAUSEA/VOMITING


   Last Admin: 19 17:30 Dose:  5 mg


Pseudoephedrine HCl (Sudafed 12 Hour*)  120 mg PO QPM [facility]


   Last Admin: 02/15/19 21:39 Dose:  120 mg


Scopolamine (Transderm-Scop 1.5 Mg Patch*)  1 patch TRANSDERM Q72H PRN


   PRN Reason: NAUSEA/VOMITING


Tramadol HCl (Ultram*)  50 mg PO Q6H PRN


   PRN Reason: moderate pain


   Last Admin: 02/15/19 21:52 Dose:  50 mg








Oxygen Devices in Use Now: None


Appearance: alert, NAD


Eyes: No Scleral Icterus, PERRLA


Ears/Nose/Mouth/Throat: NL Teeth, Lips, Gums, Mucous Membranes Moist


Neck: NL Appearance and Movements; NL JVP, Trachea Midline


Respiratory: Symmetrical Chest Expansion and Respiratory Effort, Clear to 

Auscultation


Cardiovascular: NL Sounds; No Murmurs; No JVD, RRR, No Edema


Abdominal: NL Sounds; No Tenderness; No Distention


Extremities: No Clubbing, Cyanosis, - - wound vac right shin with erythema, 

dressing to right third toe, no drainage


Neurological: Alert and Oriented x 3, NL Sensation, NL Muscle Strength and Tone


Nutrition: Taking PO's


Result Diagrams: 


 02/15/19 05:31





 02/15/19 05:26


Additional Lab and Data: 


 Lab Results











  19 Range/Units





  11:46 11:46 


 


WBC  12.0 H   (3.5-10.8)  10^3/ul


 


RBC  5.08   (4.00-5.40)  10^6/ul


 


Hgb  15.8   (12.0-16.0)  g/dl


 


Hct  46   (35-47)  %


 


MCV  90   (80-97)  fL


 


MCH  31   (27-31)  pg


 


MCHC  35   (31-36)  g/dl


 


RDW  12   (10.5-15)  %


 


Plt Count  301   (150-450)  10^3/ul


 


MPV  9.8   (7.4-10.4)  fL


 


Neut % (Auto)  72.8   %


 


Lymph % (Auto)  19.4   %


 


Mono % (Auto)  5.4   %


 


Eos % (Auto)  1.9   %


 


Baso % (Auto)  0.5   %


 


Absolute Neuts (auto)  8.8 H   (1.5-7.7)  10^3/ul


 


Absolute Lymphs (auto)  2.3   (1.0-4.8)  10^3/ul


 


Absolute Monos (auto)  0.6   (0-0.8)  10^3/ul


 


Absolute Eos (auto)  0.2   (0-0.6)  10^3/ul


 


Absolute Basos (auto)  0.1   (0-0.2)  10^3/ul


 


Absolute Nucleated RBC  0   10^3/ul


 


Nucleated RBC %  0   


 


ESR  Pending   


 


Sodium   133 L  (135-145)  mmol/L


 


Potassium   4.1  (3.5-5.0)  mmol/L


 


Chloride   103  (101-111)  mmol/L


 


Carbon Dioxide   22  (22-32)  mmol/L


 


Anion Gap   8  (2-11)  mmol/L


 


BUN   12  (6-24)  mg/dL


 


Creatinine   0.63  (0.51-0.95)  mg/dL


 


Est GFR ( Amer)   122.0  (>60)  


 


Est GFR (Non-Af Amer)   100.9  (>60)  


 


BUN/Creatinine Ratio   19.0  (8-20)  


 


Glucose   291 H  ()  mg/dL


 


Calcium   9.4  (8.6-10.3)  mg/dL


 


C-Reactive Protein   15.78 H  (<8.01)  mg/L











Microbiology and Other Data: 


 Microbiology











 19 20:51 Aerobic Blood Culture - Preliminary





 Blood Venous    No Growth Day 1





 Anaerobic Blood Culture - Preliminary





    No Growth Day 1


 


 19 17:09 Aerobic Blood Culture - Preliminary





 Blood Venous    No Growth Day 1





 Anaerobic Blood Culture - Preliminary





    No Growth Day 1


 


 19 11:00 Skin and Soft Tissue MRSA/MSSA (PCR - Final





 Toe    Mrsa Negative





    S.aureus Negative





 Gram Stain - Final


 


 19 11:00 Skin and Soft Tissue MRSA/MSSA (PCR - Final





 Leg Right    Mrsa Negative





    S.aureus Negative





 Gram Stain - Final











Diagnostic Imaging: 





Patient Name:                  JO ANN QUEVEDO                                    

                                                                  Medical Record

#: [id number]


Ordering Physician: Keshav Doyle MD                                          

                                                                  Acct.#: 

[id number]


:        1970                  Age: 48   Sex: F                       

                                                                  Location: 

SURGICAL STAY UNIT


Exam Date: 19 1633                                                       

                                                                  ADM Status: 

ADM Louis





Order Information:                                           MRI LOWER 

EXTREMITY RIGHT W/O


Accession Number:                                            [id number]


CPT:                                                         [id number]


EXAM: 


 MRI Right Lower Extremity Without Contrast, Foot 


EXAM DATE/TIME: 


 2019 7:52 PM 





CLINICAL HISTORY: 


 48 years old, female; Signs and symptoms; Other: Ulcer; Patient HX: PT has an 


ulcer on her right 3rd toe. ? Osteo; Additional info: Ulcer right third 


toe--evaluate for osteo digit-fo. PT motion due to pain. Scanned propeller to 


reduce artifact and repositioned patient to get her more comfortable, best 


images possible 





TECHNIQUE: 


 MR of the Right foot without intravenous contrast. 


COMPARISON: 


 LOEX R WO MRI LOWER EXTREMITY RIGHT W/O 2019 2:55 PM 





FINDINGS: 





LIGAMENTS: 


 Medial collateral:  Evaluation of the collateral ligaments of the third digit 


are limited by the edematous changes. Ligamentous injury cannot be excluded. No 


evidence of tear involving the remaining collateral ligaments of the forefoot. 


 Lateral collateral: See above.


 Lisfranc: No evidence of tear. 





TENDONS: 


 Flexors: Limited evaluation due to motion artifact. 


 Extensors: Limited evaluation due to motion artifact. 


 Muscles: Unremarkable. 


 Fluid:  A small effusion is identified within the third interspace. There is 


minimal effusion within the first interspace of the forefoot. 


 Sinus tarsi:  Minimal edema/fluid is seen within the sinus tarsi. 


 Plantar fascia:  The plantar fascia is intact, as visualized. The proximal 


plantar fascia is out of the field-of-view of this study. 


 Bones/joints:  This study concentrates on the mid to forefoot. Motion artifact 


limits the study. Edema is identified within the middle and distal phalanges of 


the third digit, concerning for osteomyelitis. There is soft tissue swelling of 


this digit. No significant acute marrow edema within the remaining visualized 


foot. No dislocation of the visualized foot. 


 Soft tissues:  Soft tissue swelling of the dorsum of the foot. 





IMPRESSION: 


1. Edema is identified within the middle and distal phalanges of the third 


digit, concerning for osteomyelitis. There is soft tissue swelling of this 


digit. 


2. Soft tissue swelling of the dorsum of the foot. 


3. Additional findings described above.





Patient Name:                  JO ANN QUEVEDO                                    

                                                                  Medical Record

#: [id number]


Ordering Physician: Michelle PRECIADO                                     

                                                                  Acct.#: 

[id number]


:        1970                  Age: 48   Sex: F                       

                                                                  Location: 

SURGICAL STAY UNIT


Exam Date: 19 1310                                                       

                                                                  ADM Status: 

ADM Louis





Order Information:                                           VL ANK/BRACHIAL 

INDICES


Accession Number:                                            [id number]


CPT:                                                         [id number]


INDICATION: Right lower extremity pain and nonhealing ulcers


COMPARISON: None.





TECHNIQUE: Ankle-brachial indices and Doppler tracings were obtained of the 

lower


extremities bilaterally. Volume pulse recordings were acquired at the bilateral 

ankles.





REPORT: 


Ankle-brachial indices:





Right:                      Value (SBP)                      Index


Brachial:                  162                                    


Posterior tibialis:     103                                        0.64


Dorsalis pedis:        88                                        0.54


Digit:                         41                                        0.25


Left:                        Value (SBP)                      Index


Brachial:                  157                                      


Posterior tibialis:     58                                       0.37


Dorsalis pedis:        53                                       0.33


Digit:                         79                                        0.49





Doppler waveforms (acquired at rest):


In the interrogated lower extremity arteries, Doppler waveforms are monophasic 

in all


distributions with noticeably reduced amplitude at the right dorsalis pedis and 

right


digit.





Volume pulse recordings (acquired at rest):


Volume pulse recordings, measured at the bilateral ankles, are symmetric. 





IMPRESSION: 


TEAGAN values are consistent with rest pain with lower values recorded in the left 

lower


extremity but more abnormal arterial waveforms recorded in the right lower 

extremity. In


the presence of nonhealing wounds catheter arteriography is likely indicated 

for superior


vascular characterization.


Findings were discussed over the telephone with Dr. Deng  at 1624 hours on 

2019.








____________________________________________________________


<Electronically signed by Rd Esquivel MD in OV>  19





Dictated By: Rd Esquivel MD


Dictated Date/Time: 19


This report is only to be considered final once signed by the Provider(s) as 

displayed in the "<Electronically Signed by >" field (s). Absence of a 


signature indicates the report is in a draft status and still needs to be 

finalized. In the event this document was created by someone other than the 


signing Provider, the individual initiating the document will be listed in the 

"Entered by:" or "Dictated by:" fields.


                                                                               

             1 of 2

















Assess/Plan/Problems-Billing


Assessment: 





Pt is a 48yof with no reported PMHx who presents with nonhealing wound of RLE, 

osteomyelitis of 3rd digit of RLE, new onset DM, and PAD.








- Patient Problems


(1) Diabetes


Code(s): E11.9 - TYPE 2 DIABETES MELLITUS WITHOUT COMPLICATIONS   SNOMED Code(s)

: 04145806


   Comment: 


 - Diabetic education, nutritional consult


 - Good control with Metformin 500 BID and lispro SS


 - Continue to monitor ACHS   





(2) Hypertension


Code(s): I10 - ESSENTIAL (PRIMARY) HYPERTENSION   SNOMED Code(s): 60403471


   Comment: 


 - No previous dx of HTN


 - Metoprolol 12.5 bid started with hold parameters


   





(3) Nonhealing nonsurgical wound


Code(s): T14.8XXA - OTHER INJURY OF UNSPECIFIED BODY REGION, INITIAL ENCOUNTER 

  SNOMED Code(s): 91125151


   Comment: 


 - POC as per surgery


 - Wound vac


 - Continue ceftriaxone and vanco, will need outpatient infusion and PICC line


 - ID following


 - Plan for revascularization to aid wound healing   





(4) Osteomyelitis


Code(s): M86.9 - OSTEOMYELITIS, UNSPECIFIED   SNOMED Code(s): 10208544


   Comment: 


 - R third toe ulcer debrided 


 - Local wound care, may still need amputation in the future


 - Ortho following





-ID suggests continue vanco, ceftriaxone and await cultures   





(5) Peripheral artery disease


Code(s): I73.9 - PERIPHERAL VASCULAR DISEASE, UNSPECIFIED   SNOMED Code(s): 

755352467


   Comment: 


 - TEAGAN results suggest severe PAD


 - IR recommends revascularization at outpatient and will be coordinated as an 

outpatient with Dr. Esquivel to aid in wound healing   





(6) DVT prophylaxis


Code(s): TPV5345 -    SNOMED Code(s): 194704784


   Comment: 


 - Lovenox   





(7) Full code status


Code(s): Z78.9 - OTHER SPECIFIED HEALTH STATUS   SNOMED Code(s): 685724396


   


Status and Disposition: 





Inpatient.  Discharge Monday with IV atbx, wound vac, surgical and IR follow 

ups.

## 2019-02-17 RX ADMIN — VANCOMYCIN HYDROCHLORIDE SCH MLS/HR: 1 INJECTION, POWDER, LYOPHILIZED, FOR SOLUTION INTRAVENOUS at 06:01

## 2019-02-17 RX ADMIN — INSULIN LISPRO SCH: 100 INJECTION, SOLUTION INTRAVENOUS; SUBCUTANEOUS at 17:05

## 2019-02-17 RX ADMIN — METHYLPREDNISOLONE SODIUM SUCCINATE SCH MG: 40 INJECTION, POWDER, FOR SOLUTION INTRAMUSCULAR; INTRAVENOUS at 23:49

## 2019-02-17 RX ADMIN — DOCUSATE SODIUM SCH MG: 100 CAPSULE, LIQUID FILLED ORAL at 21:18

## 2019-02-17 RX ADMIN — IBUPROFEN PRN MG: 600 TABLET, FILM COATED ORAL at 21:18

## 2019-02-17 RX ADMIN — DOCUSATE SODIUM SCH MG: 100 CAPSULE, LIQUID FILLED ORAL at 08:13

## 2019-02-17 RX ADMIN — INSULIN LISPRO SCH: 100 INJECTION, SOLUTION INTRAVENOUS; SUBCUTANEOUS at 13:07

## 2019-02-17 RX ADMIN — VANCOMYCIN HYDROCHLORIDE SCH MLS/HR: 1 INJECTION, POWDER, LYOPHILIZED, FOR SOLUTION INTRAVENOUS at 23:38

## 2019-02-17 RX ADMIN — METFORMIN HYDROCHLORIDE SCH MG: 500 TABLET, FILM COATED ORAL at 07:12

## 2019-02-17 RX ADMIN — LISINOPRIL SCH MG: 5 TABLET ORAL at 10:14

## 2019-02-17 RX ADMIN — INSULIN LISPRO SCH UNITS: 100 INJECTION, SOLUTION INTRAVENOUS; SUBCUTANEOUS at 08:13

## 2019-02-17 RX ADMIN — IBUPROFEN PRN MG: 800 TABLET ORAL at 04:20

## 2019-02-17 RX ADMIN — TRAMADOL HYDROCHLORIDE PRN MG: 50 TABLET, FILM COATED ORAL at 04:23

## 2019-02-17 RX ADMIN — LORATADINE SCH MG: 10 TABLET ORAL at 21:18

## 2019-02-17 RX ADMIN — ENOXAPARIN SODIUM SCH MG: 40 INJECTION SUBCUTANEOUS at 18:02

## 2019-02-17 RX ADMIN — VANCOMYCIN HYDROCHLORIDE SCH MLS/HR: 1 INJECTION, POWDER, LYOPHILIZED, FOR SOLUTION INTRAVENOUS at 11:23

## 2019-02-17 RX ADMIN — INSULIN LISPRO SCH: 100 INJECTION, SOLUTION INTRAVENOUS; SUBCUTANEOUS at 18:02

## 2019-02-17 RX ADMIN — METOPROLOL TARTRATE SCH MG: 25 TABLET, FILM COATED ORAL at 21:19

## 2019-02-17 RX ADMIN — IBUPROFEN PRN MG: 600 TABLET, FILM COATED ORAL at 12:29

## 2019-02-17 RX ADMIN — INSULIN LISPRO SCH: 100 INJECTION, SOLUTION INTRAVENOUS; SUBCUTANEOUS at 12:14

## 2019-02-17 RX ADMIN — METOPROLOL TARTRATE SCH MG: 25 TABLET, FILM COATED ORAL at 08:14

## 2019-02-17 RX ADMIN — PSEUDOEPHEDRINE HCL SCH MG: 120 TABLET, FILM COATED, EXTENDED RELEASE ORAL at 21:19

## 2019-02-17 RX ADMIN — CEFTRIAXONE SODIUM SCH MLS/HR: 1 INJECTION, POWDER, FOR SOLUTION INTRAVENOUS at 15:32

## 2019-02-17 RX ADMIN — VANCOMYCIN HYDROCHLORIDE SCH MLS/HR: 1 INJECTION, POWDER, LYOPHILIZED, FOR SOLUTION INTRAVENOUS at 18:02

## 2019-02-17 NOTE — PN
Subjective


Date of Service: 19


Interval History: 





No overnight events.  Anxious to get home.  States she knows she needs teaching 

on glucometer and finger checks.  She is hopping with walker and doing well.  

Good PO.  Did meet with nurition.  No CP or SOB.  Pain tolerable.  





Objective


Active Medications: 








Acetaminophen (Tylenol Tab*)  650 mg PO Q4H PRN


   PRN Reason: FEVER/PAIN


   Last Admin: 19 23:46 Dose:  650 mg


Dextrose (D50w Syringe 50 Ml*)  12.5 gm IV PUSH .FOR FS < 60 - SS PRN


   PRN Reason: FS < 60


Docusate Sodium (Colace Cap*)  100 mg PO BID [facility]


   Last Admin: 19 08:13 Dose:  100 mg


Enoxaparin Sodium (Lovenox(*))  40 mg SUBCUT Q24H [facility]


   Last Admin: 19 17:54 Dose:  40 mg


Ceftriaxone Sodium 1 gm/ (Sodium Chloride)  50 mls @ 200 mls/hr IVPB Q24H [facility]


   Last Admin: 19 15:40 Dose:  200 mls/hr


Vancomycin HCl 1,000 mg/ (Sodium Chloride)  250 mls @ 166.667 mls/hr IVPB Q6H 

[facility]


   Last Admin: 19 06:01 Dose:  166.667 mls/hr


Ibuprofen (Motrin Tab*)  600 mg PO Q8H PRN


   PRN Reason: PAIN


Insulin Human Lispro (Humalog*)  0 units SUBCUT AC [facility]; Protocol


Insulin Human Lispro (Humalog*)  0 units SUBCUT AC [facility]; Protocol


Loratadine (Claritin Tab(Nf))  10 mg PO 1800 [facility]


   Last Admin: 19 17:48 Dose:  10 mg


Magnesium Hydroxide (Milk Of Magnesia Liq*)  30 ml PO Q12H PRN


   PRN Reason: CONSTIPATION


Metoprolol Tartrate (Lopressor Tab*)  12.5 mg PO Q12HR [facility]


   Last Admin: 19 08:14 Dose:  12.5 mg


Pharmacy Consult (Vancomycin Per Pharmacy*)  1 note FOLLOW UP .VANC PER 

PHARMACY [facility]


Pharmacy Profile Note (Vancomycin Trough Check)  1 note FOLLOW UP ONCE ONE


   Stop: 19 05:01


Prochlorperazine Edisylate (Compazine Inj*)  5 mg IV Q6H PRN


   PRN Reason: NAUSEA/VOMITING


   Last Admin: 19 17:30 Dose:  5 mg


Pseudoephedrine HCl (Sudafed 12 Hour*)  120 mg PO QPM [facility]


   Last Admin: 19 17:48 Dose:  120 mg


Scopolamine (Transderm-Scop 1.5 Mg Patch*)  1 patch TRANSDERM Q72H PRN


   PRN Reason: NAUSEA/VOMITING


Tramadol HCl (Ultram*)  50 mg PO Q6H PRN


   PRN Reason: moderate pain


   Last Admin: 19 04:23 Dose:  50 mg








 Vital Signs - 8 hr











  19





  03:29 04:23


 


Temperature 97.6 F 


 


Pulse Rate 78 


 


Respiratory 16 16





Rate  


 


Blood Pressure 140/62 





(mmHg)  


 


O2 Sat by Pulse 95 





Oximetry  











Oxygen Devices in Use Now: None


Ears/Nose/Mouth/Throat: Mucous Membranes Moist


Neck: Trachea Midline


Respiratory: Symmetrical Chest Expansion and Respiratory Effort, Clear to 

Auscultation, - - diminished breath sounds 


Cardiovascular: NL Sounds; No Murmurs; No JVD, RRR


Abdominal: NL Sounds; No Tenderness; No Distention, No Hepatosplenomegaly


Extremities: - - wound vac in RLE - mild surrounding erythema, no edema.  third 

digit ulcerated with mild erythema.  No drainage or swelling 


Neurological: Alert and Oriented x 3, NL Muscle Strength and Tone


Result Diagrams: 


 02/15/19 05:31





 02/15/19 05:26


Additional Lab and Data: 


 Lab Results











  19 Range/Units





  11:46 11:46 


 


WBC  12.0 H   (3.5-10.8)  10^3/ul


 


RBC  5.08   (4.00-5.40)  10^6/ul


 


Hgb  15.8   (12.0-16.0)  g/dl


 


Hct  46   (35-47)  %


 


MCV  90   (80-97)  fL


 


MCH  31   (27-31)  pg


 


MCHC  35   (31-36)  g/dl


 


RDW  12   (10.5-15)  %


 


Plt Count  301   (150-450)  10^3/ul


 


MPV  9.8   (7.4-10.4)  fL


 


Neut % (Auto)  72.8   %


 


Lymph % (Auto)  19.4   %


 


Mono % (Auto)  5.4   %


 


Eos % (Auto)  1.9   %


 


Baso % (Auto)  0.5   %


 


Absolute Neuts (auto)  8.8 H   (1.5-7.7)  10^3/ul


 


Absolute Lymphs (auto)  2.3   (1.0-4.8)  10^3/ul


 


Absolute Monos (auto)  0.6   (0-0.8)  10^3/ul


 


Absolute Eos (auto)  0.2   (0-0.6)  10^3/ul


 


Absolute Basos (auto)  0.1   (0-0.2)  10^3/ul


 


Absolute Nucleated RBC  0   10^3/ul


 


Nucleated RBC %  0   


 


ESR  Pending   


 


Sodium   133 L  (135-145)  mmol/L


 


Potassium   4.1  (3.5-5.0)  mmol/L


 


Chloride   103  (101-111)  mmol/L


 


Carbon Dioxide   22  (22-32)  mmol/L


 


Anion Gap   8  (2-11)  mmol/L


 


BUN   12  (6-24)  mg/dL


 


Creatinine   0.63  (0.51-0.95)  mg/dL


 


Est GFR ( Amer)   122.0  (>60)  


 


Est GFR (Non-Af Amer)   100.9  (>60)  


 


BUN/Creatinine Ratio   19.0  (8-20)  


 


Glucose   291 H  ()  mg/dL


 


Calcium   9.4  (8.6-10.3)  mg/dL


 


C-Reactive Protein   15.78 H  (<8.01)  mg/L











Microbiology and Other Data: 


 Microbiology











 19 20:51 Aerobic Blood Culture - Preliminary





 Blood Venous    No Growth Day 1





 Anaerobic Blood Culture - Preliminary





    No Growth Day 1


 


 19 17:09 Aerobic Blood Culture - Preliminary





 Blood Venous    No Growth Day 1





 Anaerobic Blood Culture - Preliminary





    No Growth Day 1


 


 19 11:00 Skin and Soft Tissue MRSA/MSSA (PCR - Final





 Toe    Mrsa Negative





    S.aureus Negative





 Gram Stain - Final


 


 19 11:00 Skin and Soft Tissue MRSA/MSSA (PCR - Final





 Leg Right    Mrsa Negative





    S.aureus Negative





 Gram Stain - Final











Diagnostic Imaging: 





Patient Name:                  JO ANN QUEVEDO                                    

                                                                  Medical Record

#: [id number]


Ordering Physician: Keshav Doyle MD                                          

                                                                  Acct.#: 

[id number]


:        1970                  Age: 48   Sex: F                       

                                                                  Location: 

SURGICAL STAY UNIT


Exam Date: 19                                                       

                                                                  ADM Status: 

ADM Louis





Order Information:                                           MRI LOWER 

EXTREMITY RIGHT W/O


Accession Number:                                            [id number]


CPT:                                                         [id number]


EXAM: 


 MRI Right Lower Extremity Without Contrast, Foot 


EXAM DATE/TIME: 


 2019 7:52 PM 





CLINICAL HISTORY: 


 48 years old, female; Signs and symptoms; Other: Ulcer; Patient HX: PT has an 


ulcer on her right 3rd toe. ? Osteo; Additional info: Ulcer right third 


toe--evaluate for osteo digit-fo. PT motion due to pain. Scanned propeller to 


reduce artifact and repositioned patient to get her more comfortable, best 


images possible 





TECHNIQUE: 


 MR of the Right foot without intravenous contrast. 


COMPARISON: 


 LOEX R WO MRI LOWER EXTREMITY RIGHT W/O 2019 2:55 PM 





FINDINGS: 





LIGAMENTS: 


 Medial collateral:  Evaluation of the collateral ligaments of the third digit 


are limited by the edematous changes. Ligamentous injury cannot be excluded. No 


evidence of tear involving the remaining collateral ligaments of the forefoot. 


 Lateral collateral: See above.


 Lisfranc: No evidence of tear. 





TENDONS: 


 Flexors: Limited evaluation due to motion artifact. 


 Extensors: Limited evaluation due to motion artifact. 


 Muscles: Unremarkable. 


 Fluid:  A small effusion is identified within the third interspace. There is 


minimal effusion within the first interspace of the forefoot. 


 Sinus tarsi:  Minimal edema/fluid is seen within the sinus tarsi. 


 Plantar fascia:  The plantar fascia is intact, as visualized. The proximal 


plantar fascia is out of the field-of-view of this study. 


 Bones/joints:  This study concentrates on the mid to forefoot. Motion artifact 


limits the study. Edema is identified within the middle and distal phalanges of 


the third digit, concerning for osteomyelitis. There is soft tissue swelling of 


this digit. No significant acute marrow edema within the remaining visualized 


foot. No dislocation of the visualized foot. 


 Soft tissues:  Soft tissue swelling of the dorsum of the foot. 





IMPRESSION: 


1. Edema is identified within the middle and distal phalanges of the third 


digit, concerning for osteomyelitis. There is soft tissue swelling of this 


digit. 


2. Soft tissue swelling of the dorsum of the foot. 


3. Additional findings described above.





Patient Name:                  JO ANN QUEVEDO                                    

                                                                  Medical Record

#: [id number]


Ordering Physician: Michelle PRECIADO                                     

                                                                  Acct.#: 

[id number]


:        1970                  Age: 48   Sex: F                       

                                                                  Location: 

SURGICAL STAY UNIT


Exam Date: 19 1310                                                       

                                                                  ADM Status: 

ADM Louis





Order Information:                                           VL ANK/BRACHIAL 

INDICES


Accession Number:                                            [id number]


CPT:                                                         [id number]


INDICATION: Right lower extremity pain and nonhealing ulcers


COMPARISON: None.





TECHNIQUE: Ankle-brachial indices and Doppler tracings were obtained of the 

lower


extremities bilaterally. Volume pulse recordings were acquired at the bilateral 

ankles.





REPORT: 


Ankle-brachial indices:





Right:                      Value (SBP)                      Index


Brachial:                  162                                    


Posterior tibialis:     103                                        0.64


Dorsalis pedis:        88                                        0.54


Digit:                         41                                        0.25


Left:                        Value (SBP)                      Index


Brachial:                  157                                      


Posterior tibialis:     58                                       0.37


Dorsalis pedis:        53                                       0.33


Digit:                         79                                        0.49





Doppler waveforms (acquired at rest):


In the interrogated lower extremity arteries, Doppler waveforms are monophasic 

in all


distributions with noticeably reduced amplitude at the right dorsalis pedis and 

right


digit.





Volume pulse recordings (acquired at rest):


Volume pulse recordings, measured at the bilateral ankles, are symmetric. 





IMPRESSION: 


TEAGAN values are consistent with rest pain with lower values recorded in the left 

lower


extremity but more abnormal arterial waveforms recorded in the right lower 

extremity. In


the presence of nonhealing wounds catheter arteriography is likely indicated 

for superior


vascular characterization.


Findings were discussed over the telephone with Dr. Deng  at 1624 hours on 

2019.








____________________________________________________________


<Electronically signed by Rd Esquivel MD in OV>  19





Dictated By: Rd Esquivel MD


Dictated Date/Time: 19


This report is only to be considered final once signed by the Provider(s) as 

displayed in the "<Electronically Signed by >" field (s). Absence of a 


signature indicates the report is in a draft status and still needs to be 

finalized. In the event this document was created by someone other than the 


signing Provider, the individual initiating the document will be listed in the 

"Entered by:" or "Dictated by:" fields.


                                                                               

             1 of 2

















Assess/Plan/Problems-Billing


Assessment: 





Pt is a 48yof with no reported PMHx who presents with nonhealing wound of RLE, 

osteomyelitis of 3rd digit of RLE, new onset DM, and PAD.








- Patient Problems


(1) Diabetes


Current Visit: Yes   Status: Acute   Code(s): E11.9 - TYPE 2 DIABETES MELLITUS 

WITHOUT COMPLICATIONS   SNOMED Code(s): 75882528


   Comment: A. Good control 


Had nutrition consult 


Plan 


Needs teaching for checking glucose 


Continue Lispro Sliding Scale


Hold Metformin as patient going to IR with contrast in AM  


Endocrinology consult 


    





(2) Hypertension


Current Visit: Yes   Status: Acute   Code(s): I10 - ESSENTIAL (PRIMARY) 

HYPERTENSION   SNOMED Code(s): 47267833


   Comment: A. New diagnosis as well - started on metoprolol 


Still remains hypertensive.  


With her DM, will start Lisinopril 5 mg 


Plan


 Will need electrolyes followed up in 1 week 


Continue metoprolol BID 


 - No previous dx of HTN


 


   





(3) Nonhealing nonsurgical wound


Current Visit: Yes   Status: Acute   Code(s): T14.8XXA - OTHER INJURY OF 

UNSPECIFIED BODY REGION, INITIAL ENCOUNTER   SNOMED Code(s): 88904500


   Comment: A. S/P I&D  - Surgery and ID following 


Wound culture: Strep B and strep arginosus 


Anaerobic culture pending 


Plan 


Order PICC 


Recommend touch base with Dr. Groves prior to discharge tomorrow for 

outpatient IV Abx managment 


Patient to continue to follow up at wound care clinic 


 - POC as per surgery


 - Plan for revascularization to aid wound healing   





(4) Osteomyelitis


Current Visit: Yes   Status: Acute   Code(s): M86.9 - OSTEOMYELITIS, 

UNSPECIFIED   SNOMED Code(s): 85878233


   Comment: 


 - R third toe ulcer debrided 


 - Local wound care, may still need amputation in the future


 - Ortho following


On Vanco and CTX 


Also needs resvascularization    





(5) Peripheral artery disease


Current Visit: Yes   Status: Acute   Code(s): I73.9 - PERIPHERAL VASCULAR 

DISEASE, UNSPECIFIED   SNOMED Code(s): 252781531


   Comment: 


 - TEAGAN results suggest severe PAD


Dr. Esquivel to attempt revascularization tomorrow .  


   





(6) DVT prophylaxis


Current Visit: Yes   Status: Acute   Code(s): HLF3841 -    SNOMED Code(s): 

161458763


   Comment: 


 - Lovenox   





(7) Full code status


Current Visit: Yes   Status: Acute   Code(s): Z78.9 - OTHER SPECIFIED HEALTH 

STATUS   SNOMED Code(s): 594399098


   


Status and Disposition: 





Inpatient.  Discharge after IR revascularizaion with PICC and IV abx, wound vac

, ID surgical follow up. 





Also to follow up with Ortho - Dr. Solano or Dr. Rosen

## 2019-02-18 LAB — BUN SERPL-MCNC: 9 MG/DL (ref 6–24)

## 2019-02-18 RX ADMIN — METOPROLOL TARTRATE SCH MG: 25 TABLET, FILM COATED ORAL at 21:50

## 2019-02-18 RX ADMIN — LISINOPRIL SCH: 5 TABLET ORAL at 08:56

## 2019-02-18 RX ADMIN — LORATADINE SCH: 10 TABLET ORAL at 18:08

## 2019-02-18 RX ADMIN — INSULIN LISPRO SCH: 100 INJECTION, SOLUTION INTRAVENOUS; SUBCUTANEOUS at 11:39

## 2019-02-18 RX ADMIN — VANCOMYCIN HYDROCHLORIDE SCH MLS/HR: 1 INJECTION, POWDER, LYOPHILIZED, FOR SOLUTION INTRAVENOUS at 06:27

## 2019-02-18 RX ADMIN — METHYLPREDNISOLONE SODIUM SUCCINATE SCH MG: 40 INJECTION, POWDER, FOR SOLUTION INTRAMUSCULAR; INTRAVENOUS at 06:25

## 2019-02-18 RX ADMIN — METOPROLOL TARTRATE SCH: 25 TABLET, FILM COATED ORAL at 08:56

## 2019-02-18 RX ADMIN — TRAMADOL HYDROCHLORIDE PRN MG: 50 TABLET, FILM COATED ORAL at 21:50

## 2019-02-18 RX ADMIN — PSEUDOEPHEDRINE HCL SCH: 120 TABLET, FILM COATED, EXTENDED RELEASE ORAL at 18:09

## 2019-02-18 RX ADMIN — INSULIN LISPRO SCH UNITS: 100 INJECTION, SOLUTION INTRAVENOUS; SUBCUTANEOUS at 15:55

## 2019-02-18 RX ADMIN — DOCUSATE SODIUM SCH MG: 100 CAPSULE, LIQUID FILLED ORAL at 21:50

## 2019-02-18 RX ADMIN — ENOXAPARIN SODIUM SCH: 40 INJECTION SUBCUTANEOUS at 17:34

## 2019-02-18 RX ADMIN — INSULIN LISPRO SCH: 100 INJECTION, SOLUTION INTRAVENOUS; SUBCUTANEOUS at 12:26

## 2019-02-18 RX ADMIN — CEFTRIAXONE SODIUM SCH MLS/HR: 1 INJECTION, POWDER, FOR SOLUTION INTRAVENOUS at 15:40

## 2019-02-18 RX ADMIN — DOCUSATE SODIUM SCH: 100 CAPSULE, LIQUID FILLED ORAL at 08:56

## 2019-02-18 RX ADMIN — METHYLPREDNISOLONE SODIUM SUCCINATE SCH MG: 40 INJECTION, POWDER, FOR SOLUTION INTRAMUSCULAR; INTRAVENOUS at 12:04

## 2019-02-18 RX ADMIN — TRAMADOL HYDROCHLORIDE PRN MG: 50 TABLET, FILM COATED ORAL at 12:04

## 2019-02-18 RX ADMIN — INSULIN LISPRO SCH UNITS: 100 INJECTION, SOLUTION INTRAVENOUS; SUBCUTANEOUS at 15:54

## 2019-02-18 RX ADMIN — INSULIN LISPRO SCH UNITS: 100 INJECTION, SOLUTION INTRAVENOUS; SUBCUTANEOUS at 09:28

## 2019-02-18 RX ADMIN — INSULIN LISPRO SCH: 100 INJECTION, SOLUTION INTRAVENOUS; SUBCUTANEOUS at 06:36

## 2019-02-18 NOTE — PN
Subjective


Date of Service: 19


Interval History: 








To IR for procedure and return





Reports pain in right lower leg is minimal. Looks forward to getting wound vac 

back on as it is more comfortable than current gauze.





Discussed diabetes at length and patient is learning diet and making 

appropriate choices





Objective


Active Medications: 








Acetaminophen (Tylenol Tab*)  650 mg PO Q4H PRN


   PRN Reason: FEVER/PAIN


   Last Admin: 19 23:46 Dose:  650 mg


Dextrose (D50w Syringe 50 Ml*)  12.5 gm IV PUSH .FOR FS < 60 - SS PRN


   PRN Reason: FS < 60


Docusate Sodium (Colace Cap*)  100 mg PO BID [facility]


   Last Admin: 19 08:56 Dose:  Not Given


Enoxaparin Sodium (Lovenox(*))  40 mg SUBCUT Q24H [facility]


   Last Admin: 19 17:34 Dose:  Not Given


Ceftriaxone Sodium 1 gm/ (Sodium Chloride)  50 mls @ 200 mls/hr IVPB Q24H [facility]


   Last Admin: 19 15:40 Dose:  200 mls/hr


Ibuprofen (Motrin Tab*)  600 mg PO Q8H PRN


   PRN Reason: PAIN


   Last Admin: 19 21:18 Dose:  600 mg


Insulin Human Lispro (Humalog*)  0 units SUBCUT AC [facility]; Protocol


   Last Admin: 19 15:54 Dose:  2 units


Insulin Human Lispro (Humalog*)  0 units SUBCUT AC [facility]; Protocol


   Last Admin: 19 15:55 Dose:  2 units


Lisinopril (Prinivil Tab*)  5 mg PO DAILY [facility]


   Last Admin: 19 08:56 Dose:  Not Given


Loratadine (Claritin Tab(Nf))  10 mg PO 2100 [facility]


Magnesium Hydroxide (Milk Of Magnesia Liq*)  30 ml PO Q12H PRN


   PRN Reason: CONSTIPATION


Metoprolol Tartrate (Lopressor Tab*)  12.5 mg PO Q12HR [facility]


   Last Admin: 19 08:56 Dose:  Not Given


Prochlorperazine Edisylate (Compazine Inj*)  5 mg IV Q6H PRN


   PRN Reason: NAUSEA/VOMITING


   Last Admin: 19 17:30 Dose:  5 mg


Pseudoephedrine HCl (Sudafed 12 Hour*)  120 mg PO 2100 [facility]


Scopolamine (Transderm-Scop 1.5 Mg Patch*)  1 patch TRANSDERM Q72H PRN


   PRN Reason: NAUSEA/VOMITING


Tramadol HCl (Ultram*)  50 mg PO Q6H PRN


   PRN Reason: moderate pain


   Last Admin: 19 12:04 Dose:  50 mg








 Vital Signs - 8 hr











  19





  11:15 12:04 15:14


 


Temperature 98.0 F  


 


Pulse Rate 91  79


 


Respiratory 15 16 17





Rate   


 


Blood Pressure 145/73  168/82





(mmHg)   


 


O2 Sat by Pulse 94  91





Oximetry   














  19





  15:29 15:44 15:59


 


Temperature   


 


Pulse Rate 77 90 81


 


Respiratory 18 15 15





Rate   


 


Blood Pressure 163/78 182/82 162/79





(mmHg)   


 


O2 Sat by Pulse 91 89 92





Oximetry   














  19





  16:01 16:14 16:29


 


Temperature   


 


Pulse Rate 82 78 82


 


Respiratory 13 13 12





Rate   


 


Blood Pressure  162/67 173/78





(mmHg)   


 


O2 Sat by Pulse 90 91 90





Oximetry   














  19





  16:53 17:35


 


Temperature 97.9 F 97.9 F


 


Pulse Rate 93 93


 


Respiratory 18 18





Rate  


 


Blood Pressure 161/68 161/68





(mmHg)  


 


O2 Sat by Pulse 96 96





Oximetry  











Oxygen Devices in Use Now: None


Appearance: Comfortable


Eyes: No Scleral Icterus


Ears/Nose/Mouth/Throat: Clear Oropharnyx, Mucous Membranes Moist


Neck: NL Appearance and Movements; NL JVP


Respiratory: Symmetrical Chest Expansion and Respiratory Effort, Clear to 

Auscultation


Cardiovascular: NL Sounds; No Murmurs; No JVD, RRR, No Edema


Abdominal: NL Sounds; No Tenderness; No Distention


Lymphatic: No Cervical Adenopathy


Extremities: No Edema


Skin: No Rash or Ulcers


Neurological: Alert and Oriented x 3


Nutrition: Taking PO's


Result Diagrams: 


 02/15/19 05:31





 19 05:38


Additional Lab and Data: 


 


 Laboratory Results - last 24 hr











  19





  05:38 09:02 11:57


 


BUN  9  


 


Creatinine  0.43 L  


 


Est GFR ( Amer)  189.6  


 


Est GFR (Non-Af Amer)  156.7  


 


POC Glucose (mg/dL)   191 H  182 H


 


Vancomycin Trough  12.8  














  19





  15:36 17:25


 


BUN  


 


Creatinine  


 


Est GFR ( Amer)  


 


Est GFR (Non-Af Amer)  


 


POC Glucose (mg/dL)  176 H  246 H


 


Vancomycin Trough  











Microbiology and Other Data: 


 


 Microbiology





19 17:09   Blood Venous   Aerobic Blood Culture - Final


                            No Growth Day 5


19 17:09   Blood Venous   Anaerobic Blood Culture - Final


                            No Growth Day 5


19 11:00   Wound - Right Third   Anaerobic Culture - Final


                            Finegoldia Magna


19 11:00   Toe   Skin and Soft Tissue MRSA/MSSA (PCR - Final


                            Mrsa Negative


                            S.aureus Negative


19 11:00   Toe   Gram Stain - Final


19 11:00   Toe   Wound Culture - Final


                            Strep Agalactiae - (Group B)


                            Streptococcus Anginosus


                            Finegoldia Magna


                            Normal Ghislaine


19 11:00   Wound - Right Leg   Anaerobic Culture - Final


19 11:00   Leg Right   Skin and Soft Tissue MRSA/MSSA (PCR - Final


                            Mrsa Negative


                            S.aureus Negative


19 11:00   Leg Right   Gram Stain - Final


19 11:00   Leg Right   Wound Culture - Final


                            Strep Agalactiae - (Group B)


19 20:51   Blood Venous   Aerobic Blood Culture - Preliminary


                            No Growth Day 4


19 20:51   Blood Venous   Anaerobic Blood Culture - Preliminary


                            No Growth Day 4








Diagnostic Imaging: 





Patient Name:                  JO ANN QUEVEDO                                    

                                                                  Medical Record

#: [id number]


Ordering Physician: Keshav Doyle MD                                          

                                                                  Acct.#: 

[id number]


:        1970                  Age: 48   Sex: F                       

                                                                  Location: 

SURGICAL STAY UNIT


Exam Date: 19 1633                                                       

                                                                  ADM Status: 

ADM Louis





Order Information:                                           MRI LOWER 

EXTREMITY RIGHT W/O


Accession Number:                                            [id number]


CPT:                                                         [id number]


EXAM: 


 MRI Right Lower Extremity Without Contrast, Foot 


EXAM DATE/TIME: 


 2019 7:52 PM 





CLINICAL HISTORY: 


 48 years old, female; Signs and symptoms; Other: Ulcer; Patient HX: PT has an 


ulcer on her right 3rd toe. ? Osteo; Additional info: Ulcer right third 


toe--evaluate for osteo digit-fo. PT motion due to pain. Scanned propeller to 


reduce artifact and repositioned patient to get her more comfortable, best 


images possible 





TECHNIQUE: 


 MR of the Right foot without intravenous contrast. 


COMPARISON: 


 LOEX R WO MRI LOWER EXTREMITY RIGHT W/O 2019 2:55 PM 





FINDINGS: 





LIGAMENTS: 


 Medial collateral:  Evaluation of the collateral ligaments of the third digit 


are limited by the edematous changes. Ligamentous injury cannot be excluded. No 


evidence of tear involving the remaining collateral ligaments of the forefoot. 


 Lateral collateral: See above.


 Lisfranc: No evidence of tear. 





TENDONS: 


 Flexors: Limited evaluation due to motion artifact. 


 Extensors: Limited evaluation due to motion artifact. 


 Muscles: Unremarkable. 


 Fluid:  A small effusion is identified within the third interspace. There is 


minimal effusion within the first interspace of the forefoot. 


 Sinus tarsi:  Minimal edema/fluid is seen within the sinus tarsi. 


 Plantar fascia:  The plantar fascia is intact, as visualized. The proximal 


plantar fascia is out of the field-of-view of this study. 


 Bones/joints:  This study concentrates on the mid to forefoot. Motion artifact 


limits the study. Edema is identified within the middle and distal phalanges of 


the third digit, concerning for osteomyelitis. There is soft tissue swelling of 


this digit. No significant acute marrow edema within the remaining visualized 


foot. No dislocation of the visualized foot. 


 Soft tissues:  Soft tissue swelling of the dorsum of the foot. 





IMPRESSION: 


1. Edema is identified within the middle and distal phalanges of the third 


digit, concerning for osteomyelitis. There is soft tissue swelling of this 


digit. 


2. Soft tissue swelling of the dorsum of the foot. 


3. Additional findings described above.





Patient Name:                  JO ANN QUEVEDO                                    

                                                                  Medical Record

#: [id number]


Ordering Physician: Michelle PRECIADO                                     

                                                                  Acct.#: 

[id number]


:        1970                  Age: 48   Sex: F                       

                                                                  Location: 

SURGICAL STAY UNIT


Exam Date: 19 1310                                                       

                                                                  ADM Status: 

ADM Louis





Order Information:                                           VL ANK/BRACHIAL 

INDICES


Accession Number:                                            [id number]


CPT:                                                         [id number]


INDICATION: Right lower extremity pain and nonhealing ulcers


COMPARISON: None.





TECHNIQUE: Ankle-brachial indices and Doppler tracings were obtained of the 

lower


extremities bilaterally. Volume pulse recordings were acquired at the bilateral 

ankles.





REPORT: 


Ankle-brachial indices:





Right:                      Value (SBP)                      Index


Brachial:                  162                                    


Posterior tibialis:     103                                        0.64


Dorsalis pedis:        88                                        0.54


Digit:                         41                                        0.25


Left:                        Value (SBP)                      Index


Brachial:                  157                                      


Posterior tibialis:     58                                       0.37


Dorsalis pedis:        53                                       0.33


Digit:                         79                                        0.49





Doppler waveforms (acquired at rest):


In the interrogated lower extremity arteries, Doppler waveforms are monophasic 

in all


distributions with noticeably reduced amplitude at the right dorsalis pedis and 

right


digit.





Volume pulse recordings (acquired at rest):


Volume pulse recordings, measured at the bilateral ankles, are symmetric. 





IMPRESSION: 


TEAGAN values are consistent with rest pain with lower values recorded in the left 

lower


extremity but more abnormal arterial waveforms recorded in the right lower 

extremity. In


the presence of nonhealing wounds catheter arteriography is likely indicated 

for superior


vascular characterization.


Findings were discussed over the telephone with Dr. Deng  at 1624 hours on 

2019.








____________________________________________________________


<Electronically signed by Rd Esquivel MD in OV>  19





Dictated By: Rd Esquivel MD


Dictated Date/Time: 19


This report is only to be considered final once signed by the Provider(s) as 

displayed in the "<Electronically Signed by >" field (s). Absence of a 


signature indicates the report is in a draft status and still needs to be 

finalized. In the event this document was created by someone other than the 


signing Provider, the individual initiating the document will be listed in the 

"Entered by:" or "Dictated by:" fields.


                                                                               

             1 of 2

















Assess/Plan/Problems-Billing


Assessment: 





Pt is a 48yof with no reported PMHx who presents with nonhealing wound of RLE, 

osteomyelitis of 3rd digit of RLE, new onset DM, and PAD.








- Patient Problems


(1) Diabetes


Comment: 


- Good control 


- Had nutrition consult  


- Needs teaching for checking glucose 


- Continue Lispro Sliding Scale


- Metformin held this morning as patient went IR with contrast this AM. Cont ot 

hold for 48 hrs post procedure


- Endocrinology consult ordered


    





(2) Hypertension


Comment: 


- New diagnosis since admission. She was started on metoprolol, but still 

remains hypertensive.  


- Given DM patient was started Lisinopril 5 mg.  


- Will need electrolyes followed up in 1 week 


- Will need to follow up with PCP for further adjustment if needed


 


   





(3) Nonhealing nonsurgical wound


Comment: 


- S/P I&D  - Surgery and ID following 


- Dr. Schmitz consulting for outpatient IV Abx managment 


- Patient to continue to follow up at wound care clinic 


- Rrevascularization completed today to aid wound healing   





(4) Osteomyelitis


Comment: 


 - R third toe ulcer debrided 


 - Local wound care, may still need amputation in the future


 - Ortho following


 - Vanco discontinued by ID and CTX continued


 - PICC ordered for outpatient IV abx. Will need outpatient labs and follow up 

with Dr Schmitz


Also needs resvascularization    





(5) Peripheral artery disease


Comment: 


 - TEAGAN results suggest severe PAD


 - Dr. Esquivel performed procedure to revascularize today 


   





(6) DVT prophylaxis


Comment: 


 - Lovenox   





(7) Full code status





Status and Disposition: 





Inpatient.  Discharge after IR revascularizaion with PICC and IV abx, wound vac

, ID surgical follow up. 





Also to follow up with Ortho - Dr. Solano or Dr. Rosen 





Attending: Ty Amado

## 2019-02-18 NOTE — PN
Progress Note





- Progress Note


Date of Service: 02/18/19


SOAP: 


Subjective:





Minimal pain and it is well controlled


Using right foot for transfer only








Objective:


 











Temp Pulse Resp BP Pulse Ox


 


 98.2 F   78   15   156/64   94 


 


 02/18/19 07:45  02/18/19 07:45  02/18/19 07:45  02/18/19 07:45  02/18/19 07:45








PEX:


Right foot/leg:





Debrided area right lower shin clean without odor or purulence. Some fibrin 

formation. Exposed muscle pink and healthy, no non-viable tissue.


Third toe on right without erythema, open ulcer is clean without purulent 

drainage.








Assessment:





Ulcer/abscess right shin s/p drainage and debridement


Ulcer right third toe with probable osteo


PVD


DM








Plan:


IR intervention today-hopeful revascularization


VAC removed today-will place after IR intervention


IV abx


Ortho


When IR complete, Outpt wound vac approved, PICC line in can be d/c'd from 

surgical standpoint-will follow in wound center


All discussed with patient and her  today.

## 2019-02-18 NOTE — PN
Progress Note





- Progress Note


Date of Service: 02/18/19


SOAP: 


Subjective:


CC: right foot and leg infection


HPI: 48 year old woman with new diagnosis diabetes and right anterior leg 

abscess and right 3rd toe wound for a few weeks.  Had I&D of each area, 

tolerated well.  Vac on her right leg, tolerating it well.  No fever, rash, or 

diarrhea. 





Objective:





 Vital Signs











Temp  36.8 C   02/18/19 07:45


 


Pulse  78   02/18/19 07:45


 


Resp  15   02/18/19 07:45


 


BP  156/64   02/18/19 07:45


 


Pulse Ox  94   02/18/19 07:45








 Intake & Output











 02/17/19 02/18/19 02/18/19





 18:59 06:59 18:59


 


Intake Total 290 1000 


 


Output Total 2900 4700 


 


Balance -2610 -3700 


 


Intake:   


 


  IV Fluids 20  


 


    LR 20  


 


  IVPB 270  


 


    ABX - VANCOMYCIN 270  


 


  Oral  1000 


 


Output:   


 


  Urine 2900 4700 


 


Other:   


 


  Estimated Void Large  


 


  # Bowel Movements 1  


 


  Estimated Stool Amount Large  


 


  # Voids 1  








Gen:awake, no distress


HEENT: no thrush


Heart:RRR no murmur


Lungs:CTA BL


Abd:+BS NTND soft


Skin: no rash


MSK: R anterior lower leg vac and 3rd toe ulcer, mild edema


 Laboratory Results - last 24 hr











  02/17/19 02/17/19 02/18/19





  12:08 16:59 05:38


 


BUN    9


 


Creatinine    0.43 L


 


Est GFR ( Amer)    189.6


 


Est GFR (Non-Af Amer)    156.7


 


POC Glucose (mg/dL)  129 H  113 H 


 


Vancomycin Trough    12.8














  02/18/19





  09:02


 


BUN 


 


Creatinine 


 


Est GFR ( Amer) 


 


Est GFR (Non-Af Amer) 


 


POC Glucose (mg/dL)  191 H


 


Vancomycin Trough 














Assessment:





 Microbiology











 02/14/19 11:00 Anaerobic Culture - Final





 Wound - Right Third    Finegoldia Magna


 


 02/14/19 11:00 Skin and Soft Tissue MRSA/MSSA (PCR - Final





 Toe    Mrsa Negative





    S.aureus Negative





 Gram Stain - Final





 Wound Culture - Final





    Strep Agalactiae - (Group B)





    Streptococcus Anginosus





    Finegoldia Magna





    Normal Ghislaine


 


 02/14/19 11:00 Anaerobic Culture - Final





 Wound - Right Leg 


 


 02/14/19 11:00 Skin and Soft Tissue MRSA/MSSA (PCR - Final





 Leg Right    Mrsa Negative





    S.aureus Negative





 Gram Stain - Final





 Wound Culture - Final





    Strep Agalactiae - (Group B)


 


 02/13/19 20:51 Aerobic Blood Culture - Preliminary





 Blood Venous    No Growth Day 4





 Anaerobic Blood Culture - Preliminary





    No Growth Day 4


 


 02/13/19 17:09 Aerobic Blood Culture - Preliminary





 Blood Venous    No Growth Day 4





 Anaerobic Blood Culture - Preliminary





    No Growth Day 4














Plan:


1. dc vancomycin; cetriaxone 2 gm IV daily , day 37/42 with weekly cbc, cmp, 

crp via picc (ordered).  Fu with me 3 weeks.  She will call if fever, rash, or 

diarrhea.

## 2019-02-19 VITALS — DIASTOLIC BLOOD PRESSURE: 66 MMHG | SYSTOLIC BLOOD PRESSURE: 136 MMHG

## 2019-02-19 LAB
ANION GAP SERPL CALC-SCNC: 8 MMOL/L (ref 2–11)
BASOPHILS # BLD AUTO: 0 10^3/UL (ref 0–0.2)
BUN SERPL-MCNC: 13 MG/DL (ref 6–24)
BUN/CREAT SERPL: 25.5 (ref 8–20)
CALCIUM SERPL-MCNC: 9.4 MG/DL (ref 8.6–10.3)
CHLORIDE SERPL-SCNC: 102 MMOL/L (ref 101–111)
EOSINOPHIL # BLD AUTO: 0.1 10^3/UL (ref 0–0.6)
GLUCOSE SERPL-MCNC: 187 MG/DL (ref 70–100)
HCO3 SERPL-SCNC: 24 MMOL/L (ref 22–32)
HCT VFR BLD AUTO: 43 % (ref 35–47)
HGB BLD-MCNC: 14.8 G/DL (ref 12–16)
LYMPHOCYTES # BLD AUTO: 2.8 10^3/UL (ref 1–4.8)
MCH RBC QN AUTO: 31 PG (ref 27–31)
MCHC RBC AUTO-ENTMCNC: 34 G/DL (ref 31–36)
MCV RBC AUTO: 90 FL (ref 80–97)
MONOCYTES # BLD AUTO: 1 10^3/UL (ref 0–0.8)
NEUTROPHILS # BLD AUTO: 11 10^3/UL (ref 1.5–7.7)
NRBC # BLD AUTO: 0 10^3/UL
NRBC BLD QL AUTO: 0
PLATELET # BLD AUTO: 274 10^3/UL (ref 150–450)
POTASSIUM SERPL-SCNC: 3.4 MMOL/L (ref 3.5–5)
RBC # BLD AUTO: 4.81 10^6/UL (ref 4–5.4)
SODIUM SERPL-SCNC: 134 MMOL/L (ref 135–145)
WBC # BLD AUTO: 14.9 10^3/UL (ref 3.5–10.8)

## 2019-02-19 PROCEDURE — 05HM33Z INSERTION OF INFUSION DEVICE INTO RIGHT INTERNAL JUGULAR VEIN, PERCUTANEOUS APPROACH: ICD-10-PCS | Performed by: INTERNAL MEDICINE

## 2019-02-19 RX ADMIN — METOPROLOL TARTRATE SCH MG: 25 TABLET, FILM COATED ORAL at 09:36

## 2019-02-19 RX ADMIN — INSULIN LISPRO SCH UNITS: 100 INJECTION, SOLUTION INTRAVENOUS; SUBCUTANEOUS at 09:35

## 2019-02-19 RX ADMIN — IBUPROFEN PRN MG: 600 TABLET, FILM COATED ORAL at 12:04

## 2019-02-19 RX ADMIN — DOCUSATE SODIUM SCH MG: 100 CAPSULE, LIQUID FILLED ORAL at 09:34

## 2019-02-19 RX ADMIN — TRAMADOL HYDROCHLORIDE PRN MG: 50 TABLET, FILM COATED ORAL at 09:34

## 2019-02-19 RX ADMIN — INSULIN LISPRO SCH UNITS: 100 INJECTION, SOLUTION INTRAVENOUS; SUBCUTANEOUS at 14:27

## 2019-02-19 RX ADMIN — LISINOPRIL SCH MG: 5 TABLET ORAL at 09:34

## 2019-02-19 RX ADMIN — CEFTRIAXONE SODIUM SCH MLS/HR: 1 INJECTION, POWDER, FOR SOLUTION INTRAVENOUS at 14:40

## 2019-02-19 NOTE — PN
Progress Note





- Progress Note


Date of Service: 02/19/19


Note: 





Surgery Progress:





S: Spoke w/ Dr. Doyle this a.m. Angiogram was apparently unsuccessful 

yesterday as Dr. Esquivel was unable to cannulate the iliac stenosis from the 

femoral approach. There apparently is a plan to try an antegrade approach in 

the near future. Patient anxious to go home. 





O: 


 Vital Signs - 8 hr











  02/19/19 02/19/19 02/19/19





  03:33 07:59 09:34


 


Temperature 98.1 F 98.2 F 


 


Pulse Rate 82 83 


 


Respiratory 17 16 16





Rate   


 


Blood Pressure 140/64 143/61 





(mmHg)   


 


O2 Sat by Pulse 98 97 





Oximetry   








No PE performed. Small amt of serous-appearing drainage on ant tib dsg





A: open wound Right leg (ant tib) s/p debridement; osteomyelitis Right third toe





P: home today w/ VAC (to be changed q MWF, with wound clinic f/u q Wed either 

at [place] w/ Dr. Cardoso or here w/ Dr. Doyle, and PT to change VAC q Mon 

and Fri); daily outpt IV abx per Dr. Groves w/ f/u per his office; DM f/u w/ 

Dr. Culver.

## 2019-02-19 NOTE — DS
CC:  Dr. Kendra Renner; Dr. Keshav Doyle; Dr. Harjinder Groves; Dr. Francisco Culver *

 

DISCHARGE SUMMARY:

 

DATE OF ADMISSION:  02/13/19

 

DATE OF DISCHARGE:  02/19/19

 

PRIMARY CARE PROVIDER:  Dr. Kendra Renner.

 

CONSULTING PHYSICIANS:  Dr. Keshav Doyle, Dr. Arias Groves, Dr. Francisco Culver.

 

ATTENDING PHYSICIAN:  Dr. Victorino Correa * (dictated by Neville Rodriguez NP).

 

PRIMARY DIAGNOSES:

1.  Lower extremity wounds.

2.  Arterial insufficiency.

3.  Diabetes.

4.  Hypertension.

5.  Osteomyelitis.

 

CONSULTATIONS WHILE IN THE HOSPITAL:  Dr. Harjinder Groves, Infectious Disease; 
Dr. Keshav Doyle, Surgery; Dr. Francisco Culver, Endocrinology.

 

PROCEDURES WHILE IN THE HOSPITAL:

1.  Incision and drainage of abscess, right shin; debridement of ulcer, right 
third toe with culture.

2.  Arteriography, lower extremity bilateral; revascularization.

 

STUDIES WHILE IN THE HOSPITAL:

1.  Lower extremity MRI, 02/13/19, impression:  Extensive subcutaneous edema of 
the right lower leg, markedly asymmetric with contralateral leg.  Approximately 
3.5 cm transverse x 3.4 cm cephalocaudal soft tissue ulceration extending down 
to the muscular fascia at the anterolateral aspect of the right lower leg 7 cm 
above the ankle.  Ascending cephalad and medial from the region of the keegan 
ulceration, there is suggestion of a 3.5 cm transverse x 0.8 cm AP x 7.2 cm 
cephalocaudal located loculated abscess collection with dermal and subcutaneous 
tissue planes extending down to the muscular fascia.  Mild edema with the 
anterior compartment skeletal musculature primarily the tibialis anterior 
without evidence of muscular compartment abscess collection.  Normal bone 
marrow signal throughout the ______ without evidence of osteomyelitis.

2.  Lower extremity MRI, impression:  Edema is identified within the middle and 
distal phalanges of the third digit concerning for osteomyelitis.  There is 
soft tissue swelling of this digit.  Soft tissue swelling of dorsum of the 
foot. Additional findings as described above.

3.  Aortoiliac vascular ultrasound, impression:  No evidence of abdominal 
aortic aneurysm.

4.  Duplex scan, lower extremity artery, impression:  Reduced flow velocities 
and arranged waveforms in the bilateral common femoral arteries are consistent 
with occlusion of the common and/or external iliac arteries bilaterally.

 

DISCHARGE HOME MEDICATIONS:  New home medications:

1.  Metformin  mg once daily at bedtime.

2.  Glipizide XL 5 mg once daily in the morning.

3.  Atorvastatin 40 mg at bedtime.

4.  Lisinopril 5 mg daily.

5.  Metoprolol tartrate 12.5 mg p.o. q.12 hours.

6.  Ceftriaxone 2 g IV daily for 42 days.  Today is day 7.

 

Continued home medication:  Allegra Relief D 24 Hour tablet 1 tab p.o. q.p.m.

 

Discontinued home medications:  No home medications discontinued.

 

Changed home medications:  No home medications were changed.

 

HISTORY OF PRESENT ILLNESS/HOSPITAL COURSE:  Ms. Marsh is a 48-year-old female 
with no past medical history who presented to the emergency room with 
complaints of right lower extremity third digit wound that has been present for 
approximately 8 months.  Please see history and physical dictated by OZZY Gale, for complete summary of events leading up to the hospitalization
, but in short, the patient presented to the emergency room with right lower 
extremity third digit wound for approximately 8 months in addition to a lateral 
shin wound.  She initially managed the wounds on her own for approximately 6-1/
2 months.  They shrank in size, but never fully healed.  She reports 
approximately a month and half before coming to the emergency department she 
noticed swelling and redness around the wound.  Finally, approximately a week 
before presenting to the emergency room, she said she awoke in the middle of 
the night and her pant leg was soaked through from weeping; therefore, she 
decided to seek medical attention and was given prescription for clindamycin 
300 mg 4 times daily, which she has been taking without improvement.  The day 
of her presentation to the emergency room, she did see Dr. Doyle regarding 
her skin wound who sent her directly to the emergency room for admission.

 

During this hospital stay, the patient underwent an I and D and debridement of 
her right lower extremity wounds.  She had a wound VAC placed.  In addition, 
yesterday, she underwent revascularization due to PAD.  Due to findings of 
osteomyelitis, the patient has also been on antibiotics while inpatient.  The 
patient was on vancomycin and ceftriaxone.  The patient's antibiotic regimen 
has been narrowed by Infectious Disease to include ceftriaxone only for 42 
days.  In addition, the patient's hospitalization was further complicated by 
the new diagnoses of diabetes and hypertension which have been treated while 
inpatient and the patient is tolerating treatment well.  Due to the new 
diagnosis of diabetes, she was also consulted by Dr. Francisco Culver.  The patient 
is ready for discharge today, 02/19/19, to home.

 

REVIEW OF SYSTEMS:  The patient reports pain to right lower extremity is 
tolerable. The patient denies numbness and tingling, chest pain, shortness of 
breath, palpitations, nausea, vomiting, diarrhea, weakness.  A 12-review of 
systems was completed and all others were negative.

 

PHYSICAL EXAMINATION:  Vital signs as follows:  Temp 98.2, pulse 83, RR 16, O2 
sat is 97% on room air, /61.  General:  Ms. Marsh is a 48-year-old female
, who is resting in bed, with  at bedside, in no acute distress, appears 
stated age. The patient is dressed and ready to go.  She has her wound VAC in 
place.  HEENT: EOMs intact.  PERRLA.  Oral mucosa is moist without lesions.  
Neck:  Supple.  No lymphadenopathy.  Respiratory:  Lung sounds are clear to 
auscultation.  Good aeration.  No rhonchi, wheezes, or rales.  CV:  S1, S2 
present.  No murmurs, rubs, or gallops.  Regular rate and rhythm.  Abdomen:  
Abdomen is soft, nontender.  Bowel sounds x4.  Extremities:  Moves all 
extremities well.  No edema.  No clubbing or cyanosis.  Neuro:  Awake, alert, 
oriented x4.  Motor strength 5/5 in the upper and lower extremities.  Steady 
gait with no impairment.  Skin:  As mentioned above, the patient has a wound 
VAC to her right lateral shin.  She has a dressing to her right foot covering 
all toes which is clean, dry, and intact.  Remainder of the skin is grossly 
intact without lesions.

 

DIAGNOSTIC STUDIES/LAB DATA:  Sodium 134, potassium 3.4, chloride 102, carbon 
dioxide 24, BUN 13, creatinine 0.51, glucose 142.  WBC 14.9, hemoglobin 14.8, 
hematocrit 43, platelets 274.

 

DISCHARGE PLAN/FOLLOWUP:

1.  Right foot and leg infection; osteomyelitis:  Continue wound VAC with 
scheduled dressing changes.  Continue ceftriaxone 2 g IV daily, today's day 7 
of 42.  The patient is to have weekly CBCs, CMPs, CRPs via PICC, which have 
been ordered by Dr. Groves.  The results should be sent to Dr. Harjinder Groves.
  The patient should follow up with Dr. Harjinder Groves in 3 weeks.  The patient 
should call if she has an fevers, rash, or diarrhea.  IV infusions have been 
set up by Social Work.  Wound VAC to be changed Monday, Wednesday, Friday with 
wound clinic followup every Wednesday either at [place] with Dr. Cardoso or 
here at [facility] with [place]. Doyle. Physical Therapy to change wound VAC on Monday 
and Friday.

2.  Diabetes:  The patient was seen by Dr. Culver in consultation.  The patient 
should start metformin  mg once daily at bedtime starting tomorrow 
evening as this will allow time between last IV contrast.  The patient should 
start glipizide XL 5 mg once daily in the morning.  The patient should start 
atorvastatin 40 mg p.o. at bedtime.  The patient should continue lisinopril 5 
mg p.o. daily.  The patient should complete fingersticks monitoring with home 
glucometer once daily in the morning while fasting and then she should repeat 
fingersticks as needed if symptomatic.  The patient should follow up with 
Endocrinology in 2 to 4 weeks.  The patient should follow up with her primary 
care in addition to Endocrinology regarding diabetes and for followup after 
starting atorvastatin.

3.  Hypertension:  As previously mentioned, the patient was diagnosed with 
hypertension while admitted.  The patient is currently normotensive.  The 
patient should continue lisinopril 5 mg p.o. daily.  The patient should 
continue metoprolol 12.5 mg b.i.d.  The patient should monitor her blood 
pressure at home.  The patient should follow up with her primary care regarding 
further adjustments if needed.

4.  PICC care:  The patient will receive PICC care in [facility], 
where she will be receiving her antibiotics and having labs drawn.

5.  Education:  I discussed symptoms of new or worsening condition with both 
the patient and her .  I discussed when to return to the emergency room, 
call Dr. Groves, call primary care.  The patient and  both stated 
understanding.

 

Plan:  This plan was discussed with my attending, Dr. Victorino Correa, who agrees 
with my plan.

 

TIME SPENT:  Approximately 35 minutes was spent on this discharge, greater than 
half that time was spent face-to-face with the patient discussing my discharge 
plan and instructions.

 

____________________________________ NEVILLE RODRIGUEZ NP

 

[id number]/CPS #: [id number]

[facility]

## 2019-02-19 NOTE — CONSULT
Consult


Consult: 





[facility]


Inpatient Consult Note


Date of Consult: 2/18/19


Reason for Consult: type 2 diabetes


Reason for Admission: lower extremity wounds, arterial insufficiency





ASSESSMENT: 49 yo with newly-recognized vascular disease and T2DM (A1c 9.5%). 

She has had good glycemic control with minimal use of insulin during this 

admission. I recommend starting oral hypoglycemic agents at doses below and 

optimizing therapies to prevent macrovascular complications of diabetes. She 

will require prescriptions for the following items at discharge.





PLAN:


- start metformin XR 750mg once daily at bedtime


- start glipizide XL 5mg once daily in the morning


- start atorvastatin 40mg at bedtime


- continue lisinopril 5mg daily


- start fingerstick monitoring with home glucometer once daily in the morning


- follow-up endocrine in 2-4 weeks





SUBJECTIVE:





History of Present Illness: See admission note for details. This is a 49 yo F 

with no significant PMH who present with recurrent R foot and RLE wounds on 2/13 /19. She was initially seen at [facility] and referred to wound clinic, 

but was recommended for urgent admission and surgical debridement, which was 

performed by Dr. Doyle last week. During the workup, she was discovered to 

have vascular insufficiency and hyperglycemia with admission [id number]. She has no 

known history of diabetes, including gestational diabetes. She denies symptoms 

or known complications of diabetes.





Past Medical History:


1. Tobacco use disorder





Medications Prior to Admission:





Loratadine/Pseudoephedrine [Allergy Relief D-24Hr Tablet] 1 tab PO QPM 02/13/19 

[History Confirmed 02/13/19]





Inpatient Medications:





Acetaminophen (Tylenol Tab*)  650 mg PO Q4H PRN


   PRN Reason: FEVER/PAIN


   Last Admin: 02/13/19 23:46 Dose:  650 mg


Dextrose (D50w Syringe 50 Ml*)  12.5 gm IV PUSH .FOR FS < 60 - SS PRN


   PRN Reason: FS < 60


Docusate Sodium (Colace Cap*)  100 mg PO BID [facility]


   Last Admin: 02/19/19 09:34 Dose:  100 mg


Enoxaparin Sodium (Lovenox(*))  40 mg SUBCUT Q24H [facility]


   Last Admin: 02/18/19 17:34 Dose:  Not Given


Heparin Sodium (Porcine) (Heparin Flush Picc/Ml/Cvc(*))  1 - 3 ml FLUSH 0600,

1800 [facility]; Protocol


Ceftriaxone Sodium 1 gm/ (Sodium Chloride)  50 mls @ 200 mls/hr IVPB Q24H [facility]


   Last Admin: 02/18/19 15:40 Dose:  200 mls/hr


Ibuprofen (Motrin Tab*)  600 mg PO Q8H PRN


   PRN Reason: PAIN


   Last Admin: 02/17/19 21:18 Dose:  600 mg


Insulin Human Lispro (Humalog*)  0 units SUBCUT [facility]; Protocol


   Last Admin: 02/19/19 09:35 Dose:  2 units


Insulin Human Lispro (Humalog*)  0 units SUBCUT [facility]; Protocol


   Last Admin: 02/19/19 09:35 Dose:  2 units


Lisinopril (Prinivil Tab*)  5 mg PO DAILY [facility]


   Last Admin: 02/19/19 09:34 Dose:  5 mg


Loratadine (Claritin Tab(Nf))  10 mg PO 2100 [facility]


   Last Admin: 02/18/19 21:50 Dose:  10 mg


Magnesium Hydroxide (Milk Of Magnesia Liq*)  30 ml PO Q12H PRN


   PRN Reason: CONSTIPATION


Metoprolol Tartrate (Lopressor Tab*)  12.5 mg PO Q12HR [facility]


   Last Admin: 02/19/19 09:36 Dose:  12.5 mg


Prochlorperazine Edisylate (Compazine Inj*)  5 mg IV Q6H PRN


   PRN Reason: NAUSEA/VOMITING


   Last Admin: 02/14/19 17:30 Dose:  5 mg


Pseudoephedrine HCl (Sudafed 12 Hour*)  120 mg PO 2100 [facility]


   Last Admin: 02/18/19 21:50 Dose:  120 mg


Scopolamine (Transderm-Scop 1.5 Mg Patch*)  1 patch TRANSDERM Q72H PRN


   PRN Reason: NAUSEA/VOMITING


Tramadol HCl (Ultram*)  50 mg PO Q6H PRN


   PRN Reason: moderate pain


   Last Admin: 02/19/19 09:34 Dose:  50 mg





Allergies/Intolerances: Contrast agents. PCN.





Social History: Lives with . Family nearby, including 2 grandchildren 

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 Active smoker. Denies significant alcohol 

or drugs.





Family History: Diabetes and vascular disease.





Review of Systems: As above.





OBJECTIVE:











Temp Pulse Resp BP Pulse Ox


 


 98.2 F   83   16   143/61   97 


 


 02/19/19 07:59  02/19/19 07:59  02/19/19 09:34  02/19/19 07:59  02/19/19 07:59








General: alert, pleasant, oriented, no distress


ENT: neck supple, no thyromegaly, no bruit is heard


Chest: CTAB, no wheezing or crackles


CV: RRR, no murmur


Abdomen: soft, non-tender


Extremities: no edema, distal pulses not palpable, dressings on anterior RLE 

and R foot


Skin: warm, dry, no rash


Neuro: grossly intact motor/sensory in extremities


Psych: restricted affect, pleasant





Labs:





Glucose Results


02/17/19 11:30  129


02/17/19 16:30  113


02/18/19 07:30  191


02/18/19 11:30  181


02/18/19 16:01  176


02/18/19 21:55  163


02/19/19 07:30  154











WBC  14.9 10^3/ul (3.5-10.8)  H  02/19/19  04:53    


 


RBC  4.81 10^6/ul (4.00-5.40)   02/19/19  04:53    


 


Hgb  14.8 g/dl (12.0-16.0)   02/19/19  04:53    


 


Hct  43 % (35-47)   02/19/19  04:53    


 


MCV  90 fL (80-97)   02/19/19  04:53    


 


MCH  31 pg (27-31)   02/19/19  04:53    


 


MCHC  34 g/dl (31-36)   02/19/19  04:53    


 


RDW  13 % (10.5-15)   02/19/19  04:53    


 


Plt Count  274 10^3/ul (150-450)   02/19/19  04:53    


 


MPV  9.7 fL (7.4-10.4)   02/19/19  04:53    


 


Neut % (Auto)  73.6 %  02/19/19  04:53    


 


Lymph % (Auto)  18.9 %  02/19/19  04:53    


 


Mono % (Auto)  6.9 %  02/19/19  04:53    


 


Eos % (Auto)  0.4 %  02/19/19  04:53    


 


Baso % (Auto)  0.2 %  02/19/19  04:53    


 


Absolute Neuts (auto)  11.0 10^3/ul (1.5-7.7)  H  02/19/19  04:53    


 


Absolute Lymphs (auto)  2.8 10^3/ul (1.0-4.8)   02/19/19  04:53    


 


Absolute Monos (auto)  1.0 10^3/ul (0-0.8)  H  02/19/19  04:53    


 


Absolute Eos (auto)  0.1 10^3/ul (0-0.6)   02/19/19  04:53    


 


Absolute Basos (auto)  0 10^3/ul (0-0.2)   02/19/19  04:53    


 


Absolute Nucleated RBC  0 10^3/ul  02/19/19  04:53    


 


Nucleated RBC %  0   02/19/19  04:53    


 


ESR  44 mm/Hr (0-14)  H  02/13/19  11:46    


 


Sodium  134 mmol/L (135-145)  L  02/19/19  04:53    


 


Potassium  3.4 mmol/L (3.5-5.0)  L  02/19/19  04:53    


 


Chloride  102 mmol/L (101-111)   02/19/19  04:53    


 


Carbon Dioxide  24 mmol/L (22-32)   02/19/19  04:53    


 


Anion Gap  8 mmol/L (2-11)   02/19/19  04:53    


 


BUN  13 mg/dL (6-24)   02/19/19  04:53    


 


Creatinine  0.51 mg/dL (0.51-0.95)   02/19/19  04:53    


 


Est GFR ( Amer)  155.7  (>60)   02/19/19  04:53    


 


Est GFR (Non-Af Amer)  128.7  (>60)   02/19/19  04:53    


 


BUN/Creatinine Ratio  25.5  (8-20)  H  02/19/19  04:53    


 


Glucose  187 mg/dL ()  H  02/19/19  04:53    


 


POC Glucose (mg/dL)  154 mg/dL ()  H  02/19/19  09:26    


 


Hemoglobin A1c  9.5 % (4.0-5.6)  H  02/15/19  05:31    


 


Calcium  9.4 mg/dL (8.6-10.3)   02/19/19  04:53    


 


Total Bilirubin  0.60 mg/dL (0.2-1.0)   02/15/19  05:26    


 


AST  18 U/L (13-39)   02/15/19  05:26    


 


ALT  20 U/L (7-52)   02/15/19  05:26    


 


Alkaline Phosphatase  59 U/L ()   02/15/19  05:26    


 


C-Reactive Protein  15.78 mg/L (<8.01)  H  02/13/19  11:46    


 


Total Protein  6.5 g/dL (6.4-8.9)   02/15/19  05:26    


 


Albumin  3.3 g/dL (3.2-5.2)   02/15/19  05:26    


 


Globulin  3.2 g/dL (2-4)   02/15/19  05:26    


 


Albumin/Globulin Ratio  1.0  (1-3)   02/15/19  05:26    


 


Vancomycin Trough  12.8 mcg/mL  02/18/19  05:38

## 2020-07-06 NOTE — PN
Progress Note





- Progress Note


Date of Service: 02/14/19


SOAP: 


Subjective:





Feeling a little better


No pain in right foot








Objective:


 











Temp Pulse Resp BP Pulse Ox


 


 98.5 F   86   18   159/59   95 


 


 02/14/19 07:33  02/14/19 07:33  02/14/19 07:45  02/14/19 07:33  02/14/19 07:33








PEX:


Right foot with less edema and redness,


Dressing in place on right shin





Labs reviewed-BS elevated





MRI right leg-possible abscess right shin, possible osteo right third toe


TEAGAN's reviewed-significant PVD in both lower extremities








Assessment:





Ulcer right third toe


Ulcer with possible abscess right shin


PVD








Plan:





To OR today for incision and drainage/debridement of right shin ulcer. 

Procedure discussed with patient and risks of bleeding, infection, limb loss, 

anesthesia all explained.





Also discussed with Dr. Romo-she will need further vascular evaluation 

with possible angiogram. For now will treat ulcer third toe with antibiotics 

and avoid debridement/amputation of right third toe until possible vascular 

intervention to improve inflow. May consider ortho foot consult as well. 81-year-old F with a PMH of HTN, myeloproliferative disorder, DVT/PE (2015, on xarelto), chronic UTIs and hydronephrosis admitted in Jan for multi-drug resistant UTI, worsening of her myeloproliferative d/o, and FTT, discharged to rehab at that time, who presents with four days of "dark colored urine" admitted for urological workup and SASHA placement.